# Patient Record
Sex: FEMALE | Race: WHITE | Employment: FULL TIME | URBAN - METROPOLITAN AREA
[De-identification: names, ages, dates, MRNs, and addresses within clinical notes are randomized per-mention and may not be internally consistent; named-entity substitution may affect disease eponyms.]

---

## 2018-10-01 ENCOUNTER — TRANSFERRED RECORDS (OUTPATIENT)
Dept: HEALTH INFORMATION MANAGEMENT | Facility: CLINIC | Age: 23
End: 2018-10-01

## 2018-10-01 LAB — PAP SMEAR - HIM PATIENT REPORTED: NEGATIVE

## 2019-03-19 ENCOUNTER — OFFICE VISIT (OUTPATIENT)
Dept: FAMILY MEDICINE | Facility: CLINIC | Age: 24
End: 2019-03-19
Payer: COMMERCIAL

## 2019-03-19 VITALS
TEMPERATURE: 98.3 F | RESPIRATION RATE: 16 BRPM | BODY MASS INDEX: 26.06 KG/M2 | OXYGEN SATURATION: 97 % | HEART RATE: 71 BPM | DIASTOLIC BLOOD PRESSURE: 61 MMHG | HEIGHT: 61 IN | WEIGHT: 138 LBS | SYSTOLIC BLOOD PRESSURE: 101 MMHG

## 2019-03-19 DIAGNOSIS — F41.1 GENERALIZED ANXIETY DISORDER: ICD-10-CM

## 2019-03-19 DIAGNOSIS — F33.41 RECURRENT MAJOR DEPRESSION IN PARTIAL REMISSION (H): Primary | ICD-10-CM

## 2019-03-19 DIAGNOSIS — M08.00 JRA (JUVENILE RHEUMATOID ARTHRITIS) (H): ICD-10-CM

## 2019-03-19 PROCEDURE — 99203 OFFICE O/P NEW LOW 30 MIN: CPT | Performed by: NURSE PRACTITIONER

## 2019-03-19 SDOH — HEALTH STABILITY: MENTAL HEALTH: HOW OFTEN DO YOU HAVE A DRINK CONTAINING ALCOHOL?: NEVER

## 2019-03-19 ASSESSMENT — MIFFLIN-ST. JEOR: SCORE: 1310.4

## 2019-03-19 NOTE — PROGRESS NOTES
"    SUBJECTIVE:   Loraine Davis is a 24 year old female who presents to clinic today for the following health issues:        Abnormal Mood Symptoms      Duration: 6 months     Description:  Depression: YES  Anxiety: YES  Panic attacks: no      Accompanying signs and symptoms: see PHQ-9 and NICHO scores    History (similar episodes/previous evaluation): yes    Precipitating or alleviating factors: None    Has been off all antidepressants for almost 6 months due to no insurance.   Was stable on lamictal.    Has been flaring since going off meds.   Wants to restart  [place]oved into the city and needs a new psychiatrist.         Musculoskeletal problem/pain  PMH JRA diagnosed and treated at [facility] in teens.    Needs a new rheumatologist.      Duration: 2 weeks     Description  Location: both hands and wrist     Intensity:  moderate    Accompanying signs and symptoms: numbness      Problem list and histories reviewed & adjusted, as indicated.  Additional history: as documented    Reviewed and updated as needed this visit by clinical staff  Tobacco  Allergies  Meds  Problems  Med Hx  Surg Hx  Fam Hx  Soc Hx        Reviewed and updated as needed this visit by Provider  Tobacco  Allergies  Meds  Problems  Med Hx  Surg Hx  Fam Hx           ROS:  Constitutional, HEENT, cardiovascular, pulmonary, GI, , musculoskeletal, neuro, skin, endocrine and psych systems are negative, except as otherwise noted.    OBJECTIVE:     /61   Pulse 71   Temp 98.3  F (36.8  C) (Oral)   Resp 16   Ht 1.537 m (5' 0.5\")   Wt 62.6 kg (138 lb)   SpO2 97%   BMI 26.51 kg/m    Body mass index is 26.51 kg/m .  GENERAL: healthy, alert and no distress  EYES: Eyes grossly normal to inspection, PERRL and conjunctivae and sclerae normal  HENT: ear canals and TM's normal, nose and mouth without ulcers or lesions  NECK: no adenopathy, no asymmetry, masses, or scars and thyroid normal to palpation  RESP: lungs clear to auscultation - " no rales, rhonchi or wheezes  CV: regular rate and rhythm, normal S1 S2, no S3 or S4, no murmur, click or rub, no peripheral edema and peripheral pulses strong  ABDOMEN: soft, nontender, no hepatosplenomegaly, no masses and bowel sounds normal  MS: no gross musculoskeletal defects noted, no edema  SKIN: no suspicious lesions or rashes  NEURO: Normal strength and tone, mentation intact and speech normal  PSYCH: mentation appears normal, affect normal/bright      ASSESSMENT/PLAN:       ICD-10-CM    1. Recurrent major depression in partial remission (H) F33.41 MENTAL HEALTH REFERRAL  - Adult; Psychiatry and Medication Management; Psychiatry; [facility]: Psychiatry Clinic [phone]; We will contact you to schedule the appointment or please call with any questions   2. Generalized anxiety disorder F41.1 MENTAL HEALTH REFERRAL  - Adult; Psychiatry and Medication Management; Psychiatry; [facility]: Psychiatry Clinic [phone]; We will contact you to schedule the appointment or please call with any questions   3. JRA (juvenile rheumatoid arthritis) (H) M08.00 RHEUMATOLOGY REFERRAL     Refer to establish with rheumatology and psychiatry.      See Patient Instructions    ASHL[facility]

## 2019-07-15 ENCOUNTER — OFFICE VISIT (OUTPATIENT)
Dept: URGENT CARE | Facility: URGENT CARE | Age: 24
End: 2019-07-15
Payer: COMMERCIAL

## 2019-07-15 VITALS
BODY MASS INDEX: 27.08 KG/M2 | RESPIRATION RATE: 16 BRPM | WEIGHT: 141 LBS | OXYGEN SATURATION: 97 % | DIASTOLIC BLOOD PRESSURE: 81 MMHG | TEMPERATURE: 99.4 F | HEART RATE: 85 BPM | SYSTOLIC BLOOD PRESSURE: 130 MMHG

## 2019-07-15 DIAGNOSIS — L03.115 CELLULITIS OF RIGHT FOOT: Primary | ICD-10-CM

## 2019-07-15 LAB
BASOPHILS # BLD AUTO: 0 10E9/L (ref 0–0.2)
BASOPHILS NFR BLD AUTO: 0.2 %
DIFFERENTIAL METHOD BLD: NORMAL
EOSINOPHIL # BLD AUTO: 0.1 10E9/L (ref 0–0.7)
EOSINOPHIL NFR BLD AUTO: 0.7 %
ERYTHROCYTE [DISTWIDTH] IN BLOOD BY AUTOMATED COUNT: 11.7 % (ref 10–15)
HCT VFR BLD AUTO: 43.1 % (ref 35–47)
HGB BLD-MCNC: 14.7 G/DL (ref 11.7–15.7)
LYMPHOCYTES # BLD AUTO: 1.3 10E9/L (ref 0.8–5.3)
LYMPHOCYTES NFR BLD AUTO: 16.2 %
MCH RBC QN AUTO: 31.1 PG (ref 26.5–33)
MCHC RBC AUTO-ENTMCNC: 34.1 G/DL (ref 31.5–36.5)
MCV RBC AUTO: 91 FL (ref 78–100)
MONOCYTES # BLD AUTO: 1 10E9/L (ref 0–1.3)
MONOCYTES NFR BLD AUTO: 11.5 %
NEUTROPHILS # BLD AUTO: 5.9 10E9/L (ref 1.6–8.3)
NEUTROPHILS NFR BLD AUTO: 71.4 %
PLATELET # BLD AUTO: 291 10E9/L (ref 150–450)
RBC # BLD AUTO: 4.73 10E12/L (ref 3.8–5.2)
WBC # BLD AUTO: 8.2 10E9/L (ref 4–11)

## 2019-07-15 PROCEDURE — 96372 THER/PROPH/DIAG INJ SC/IM: CPT | Performed by: INTERNAL MEDICINE

## 2019-07-15 PROCEDURE — 99214 OFFICE O/P EST MOD 30 MIN: CPT | Mod: 25 | Performed by: INTERNAL MEDICINE

## 2019-07-15 PROCEDURE — 87070 CULTURE OTHR SPECIMN AEROBIC: CPT | Performed by: INTERNAL MEDICINE

## 2019-07-15 PROCEDURE — 36415 COLL VENOUS BLD VENIPUNCTURE: CPT | Performed by: INTERNAL MEDICINE

## 2019-07-15 PROCEDURE — 85025 COMPLETE CBC W/AUTO DIFF WBC: CPT | Performed by: INTERNAL MEDICINE

## 2019-07-15 RX ORDER — CEPHALEXIN 500 MG/1
500 CAPSULE ORAL 3 TIMES DAILY
Qty: 21 CAPSULE | Refills: 0 | Status: SHIPPED | OUTPATIENT
Start: 2019-07-15 | End: 2019-11-15

## 2019-07-15 RX ORDER — CEFTRIAXONE SODIUM 1 G
1 VIAL (EA) INJECTION ONCE
Status: COMPLETED | OUTPATIENT
Start: 2019-07-15 | End: 2019-07-15

## 2019-07-15 RX ADMIN — Medication 1 G: at 20:11

## 2019-07-15 ASSESSMENT — ENCOUNTER SYMPTOMS: FEVER: 0

## 2019-07-16 NOTE — PROGRESS NOTES
Subjective     Loraine Davis is a 24 year old female who presents to clinic today for the following health issues:    HPI   ED/UC Followup:    Facility:  [facility]  Date of visit: 7/15/2019  Reason for visit: TOE INJURY  Current Status: less painful,        Swelling has remained same.   Redness has improved.   Pain has improved and she was able to sleep.   Last night she had clear drainage after removing dressings. No drainage since.   No fever or chills.       Reviewed and updated as needed this visit by Provider         Review of Systems   ROS COMP: Constitutional, HEENT, cardiovascular, pulmonary, gi and gu systems are negative, except as otherwise noted.      Objective    There were no vitals taken for this visit.  There is no height or weight on file to calculate BMI.  Physical Exam   GENERAL: healthy, alert and no distress  EYES: Eyes grossly normal to inspection  HENT:  nose and mouth without ulcers or lesions  MS: no gross musculoskeletal defects noted, no edema  SKIN: blister on the right great toe, + tenderness and edema over the right foot up to 2nd metatarsal, white maceration from 3-5th toes     Diagnostic Test Results:  Labs reviewed in Epic        Assessment & Plan     1. Cellulitis of right foot  - symptoms improving   - advised below  Continue ibuprofen as needed   Continue ice and elevating foot  Would cultures are still being processed, urgent care provider will let you know about the results  Follow up within next 2-3 days if symptoms are not improving     2. Tinea pedis of right foot  - advised below  Once your symptoms improve - you can use over the counter clotrimazole twice daily, apply to the affected toes until you notice improvement     Maddie Colindres MD  [facility]

## 2019-07-16 NOTE — PATIENT INSTRUCTIONS
Keflex 3 x day for 10 days. Yogurt.  antibiotics shot given here today  Watch for fever & spreading beyond markings.    Elevate foot.  Close follow up.  Recheck tomorrow    To ER if worse    White count normal & is reassuring    Component      Latest Ref Rng & Units 7/15/2019   WBC      4.0 - 11.0 10e9/L 8.2   RBC Count      3.8 - 5.2 10e12/L 4.73   Hemoglobin      11.7 - 15.7 g/dL 14.7   Hematocrit      35.0 - 47.0 % 43.1   MCV      78 - 100 fl 91   MCH      26.5 - 33.0 pg 31.1   MCHC      31.5 - 36.5 g/dL 34.1   RDW      10.0 - 15.0 % 11.7   Platelet Count      150 - 450 10e9/L 291   % Neutrophils      % 71.4   % Lymphocytes      % 16.2   % Monocytes      % 11.5   % Eosinophils      % 0.7   % Basophils      % 0.2   Absolute Neutrophil      1.6 - 8.3 10e9/L 5.9   Absolute Lymphocytes      0.8 - 5.3 10e9/L 1.3   Absolute Monocytes      0.0 - 1.3 10e9/L 1.0   Absolute Eosinophils      0.0 - 0.7 10e9/L 0.1   Absolute Basophils      0.0 - 0.2 10e9/L 0.0   Diff Method       Automated Method       Patient Education     Cellulitis  Cellulitis is an infection of the deep layers of skin. A break in the skin, such as a cut or scratch, can let bacteria under the skin. If the bacteria get to deep layers of the skin, it can be serious. If not treated, cellulitis can get into the bloodstream and lymph nodes. The infection can then spread throughout the body. This causes serious illness.  Cellulitis causes the affected skin to become red, swollen, warm, and sore. The reddened areas have a visible border. An open sore may leak fluid (pus). You may have a fever, chills, and pain.  Cellulitis is treated with antibiotics taken for 7 to 10 days. An open sore may be cleaned and covered with cool wet gauze. Symptoms should get better 1 to 2 days after treatment is started. Make sure to take all the antibiotics for the full number of days until they are gone. Keep taking the medicine even if your symptoms go away.  Home care  Follow  these tips:    Limit the use of the part of your body with cellulitis.     If the infection is on your leg, keep your leg raised while sitting. This will help to reduce swelling.    Take all of the antibiotic medicine exactly as directed until it is gone. Do not miss any doses, especially during the first 7 days. Don t stop taking the medicine when your symptoms get better.    Keep the affected area clean and dry.    Wash your hands with soap and warm water before and after touching your skin. Anyone else who touches your skin should also wash his or her hands. Don't share towels.  Follow-up care  Follow up with your healthcare provider, or as advised. If your infection does not go away on the first antibiotic, your healthcare provider will prescribe a different one.  When to seek medical advice  Call your healthcare provider right away if any of these occur:    Red areas that spread    Swelling or pain that gets worse    Fluid leaking from the skin (pus)    Fever higher of 100.4  F (38.0  C) or higher after 2 days on antibiotics  Date Last Reviewed: 9/1/2016 2000-2018 [employer]. [address] [place] [address], [place] [address]. All rights reserved. This information is not intended as a substitute for professional medical care. Always follow your healthcare professional's instructions.

## 2019-07-16 NOTE — PROGRESS NOTES
SUBJECTIVE:   Loraine Davis is a 24 year old female presenting with a chief complaint of   Chief Complaint   Patient presents with     Toe Injury     right little toe, had xrays friday. now has white sots and very swollen       She is an established patient of [facility] [place].        Onset of symptoms was 5 day(s) ago.  Woke up with right pinky toe pain  Seen in urgency room 4 days ago.  Xray's normal. Checked for puncture wounds.   Dx with sprain  Since then  Course of illness is worsening.    Location: right foot  Context: more swollen, red/purple and now with white blisters  Current and Associated symptoms: redness, warmth, swelling and pain  Treatment measures tried include: ibuprofen, elevation and foot boot  Relief from treatment: minor        Review of Systems   Constitutional: Negative for fever.       No past medical history on file.  No family history on file.  Current Outpatient Medications   Medication Sig Dispense Refill     cephALEXin (KEFLEX) 500 MG capsule Take 1 capsule (500 mg) by mouth 3 times daily for 10 days 21 capsule 0     albuterol (PROAIR HFA, PROVENTIL HFA, VENTOLIN HFA) 108 (90 BASE) MCG/ACT inhaler Take 2 puffs inhaled every 4 hrs prn wheezing 1 Inhaler 11     levonorgestrel (MIRENA) 20 MCG/24HR IUD 1 each (20 mcg) by Intrauterine route once for 1 dose Placed 10/2019 at [facility] in [place]       Social History     Tobacco Use     Smoking status: Never Smoker     Smokeless tobacco: Never Used   Substance Use Topics     Alcohol use: Yes     Frequency: Never       OBJECTIVE  /81 (BP Location: Right arm, Cuff Size: Adult Regular)   Pulse 85   Temp 99.4  F (37.4  C) (Oral)   Resp 16   Wt 64 kg (141 lb)   SpO2 97%   BMI 27.08 kg/m      Physical Exam   Constitutional: She appears well-developed and well-nourished.   Musculoskeletal:   right foot  Swollen, red painful foot with yellow pustules within   Surrounding purplish area which was 2 cm in length.    The above  purplish area is within  9 cm area of redness on dorsum of foot including 4th,5th toes    Beyond & above red area is 6 cm of pink skin (proximal dorsal foot/up to ankle.)    Area outlined & photo taken by pt       Vitals reviewed.      Labs:  Results for orders placed or performed in visit on 07/15/19 (from the past 24 hour(s))   CBC with platelets and differential   Result Value Ref Range    WBC 8.2 4.0 - 11.0 10e9/L    RBC Count 4.73 3.8 - 5.2 10e12/L    Hemoglobin 14.7 11.7 - 15.7 g/dL    Hematocrit 43.1 35.0 - 47.0 %    MCV 91 78 - 100 fl    MCH 31.1 26.5 - 33.0 pg    MCHC 34.1 31.5 - 36.5 g/dL    RDW 11.7 10.0 - 15.0 %    Platelet Count 291 150 - 450 10e9/L    % Neutrophils 71.4 %    % Lymphocytes 16.2 %    % Monocytes 11.5 %    % Eosinophils 0.7 %    % Basophils 0.2 %    Absolute Neutrophil 5.9 1.6 - 8.3 10e9/L    Absolute Lymphocytes 1.3 0.8 - 5.3 10e9/L    Absolute Monocytes 1.0 0.0 - 1.3 10e9/L    Absolute Eosinophils 0.1 0.0 - 0.7 10e9/L    Absolute Basophils 0.0 0.0 - 0.2 10e9/L    Diff Method Automated Method            ASSESSMENT:      ICD-10-CM    1. Cellulitis of right foot L03.115 cefTRIAXone (ROCEPHIN) injection 1 g     CBC with platelets and differential     Wound Culture Aerobic Bacterial     cephALEXin (KEFLEX) 500 MG capsule          Patient Instructions     Keflex 3 x day for 10 days. Yogurt.  antibiotics shot given here today  Watch for fever & spreading beyond markings.    Elevate foot.  Close follow up.  Recheck tomorrow    To ER if worse    White count normal & is reassuring    Component      Latest Ref Rng & Units 7/15/2019   WBC      4.0 - 11.0 10e9/L 8.2   RBC Count      3.8 - 5.2 10e12/L 4.73   Hemoglobin      11.7 - 15.7 g/dL 14.7   Hematocrit      35.0 - 47.0 % 43.1   MCV      78 - 100 fl 91   MCH      26.5 - 33.0 pg 31.1   MCHC      31.5 - 36.5 g/dL 34.1   RDW      10.0 - 15.0 % 11.7   Platelet Count      150 - 450 10e9/L 291   % Neutrophils      % 71.4   % Lymphocytes      % 16.2    % Monocytes      % 11.5   % Eosinophils      % 0.7   % Basophils      % 0.2   Absolute Neutrophil      1.6 - 8.3 10e9/L 5.9   Absolute Lymphocytes      0.8 - 5.3 10e9/L 1.3   Absolute Monocytes      0.0 - 1.3 10e9/L 1.0   Absolute Eosinophils      0.0 - 0.7 10e9/L 0.1   Absolute Basophils      0.0 - 0.2 10e9/L 0.0   Diff Method       Automated Method       Patient Education     Cellulitis  Cellulitis is an infection of the deep layers of skin. A break in the skin, such as a cut or scratch, can let bacteria under the skin. If the bacteria get to deep layers of the skin, it can be serious. If not treated, cellulitis can get into the bloodstream and lymph nodes. The infection can then spread throughout the body. This causes serious illness.  Cellulitis causes the affected skin to become red, swollen, warm, and sore. The reddened areas have a visible border. An open sore may leak fluid (pus). You may have a fever, chills, and pain.  Cellulitis is treated with antibiotics taken for 7 to 10 days. An open sore may be cleaned and covered with cool wet gauze. Symptoms should get better 1 to 2 days after treatment is started. Make sure to take all the antibiotics for the full number of days until they are gone. Keep taking the medicine even if your symptoms go away.  Home care  Follow these tips:    Limit the use of the part of your body with cellulitis.     If the infection is on your leg, keep your leg raised while sitting. This will help to reduce swelling.    Take all of the antibiotic medicine exactly as directed until it is gone. Do not miss any doses, especially during the first 7 days. Don t stop taking the medicine when your symptoms get better.    Keep the affected area clean and dry.    Wash your hands with soap and warm water before and after touching your skin. Anyone else who touches your skin should also wash his or her hands. Don't share towels.  Follow-up care  Follow up with your healthcare provider, or as  advised. If your infection does not go away on the first antibiotic, your healthcare provider will prescribe a different one.  When to seek medical advice  Call your healthcare provider right away if any of these occur:    Red areas that spread    Swelling or pain that gets worse    Fluid leaking from the skin (pus)    Fever higher of 100.4  F (38.0  C) or higher after 2 days on antibiotics  Date Last Reviewed: 9/1/2016 2000-2018 [employer]. [address] [place] [address], [place] [address]. All rights reserved. This information is not intended as a substitute for professional medical care. Always follow your healthcare professional's instructions.

## 2019-07-17 ENCOUNTER — OFFICE VISIT (OUTPATIENT)
Dept: FAMILY MEDICINE | Facility: CLINIC | Age: 24
End: 2019-07-17
Payer: COMMERCIAL

## 2019-07-17 VITALS
WEIGHT: 141 LBS | SYSTOLIC BLOOD PRESSURE: 108 MMHG | DIASTOLIC BLOOD PRESSURE: 68 MMHG | HEART RATE: 92 BPM | OXYGEN SATURATION: 97 % | TEMPERATURE: 98.5 F | BODY MASS INDEX: 27.08 KG/M2

## 2019-07-17 DIAGNOSIS — B35.3 TINEA PEDIS OF RIGHT FOOT: ICD-10-CM

## 2019-07-17 DIAGNOSIS — L03.115 CELLULITIS OF RIGHT FOOT: Primary | ICD-10-CM

## 2019-07-17 PROCEDURE — 99214 OFFICE O/P EST MOD 30 MIN: CPT | Performed by: FAMILY MEDICINE

## 2019-07-17 NOTE — PATIENT INSTRUCTIONS
Continue ibuprofen as needed   Continue ice and elevating foot  Would cultures are still being processed, urgent care provider will let you know about the results  Follow up within next 2-3 days if symptoms are not improving     Once your symptoms improve - you can use over the counter clotrimazole twice daily, apply to the affected toes until you notice improvement

## 2019-07-18 LAB
BACTERIA SPEC CULT: NORMAL
Lab: NORMAL
SPECIMEN SOURCE: NORMAL

## 2019-09-05 ENCOUNTER — TELEPHONE (OUTPATIENT)
Dept: FAMILY MEDICINE | Facility: CLINIC | Age: 24
End: 2019-09-05

## 2019-09-05 NOTE — LETTER
September 5, 2019      Loraine Talya Ryan  [address] [place] [address]        Preet Baron,      Our team is reaching out to you, in regards to your health. After a review of your chart it appears, that you have not completed the recommended chlamydia screening this year. The [place] Department of Health has recommended that you get yearly screening if you meet any of the following criteria:    If you currently are or have been sexually active  If you currently are or have been pregnant  If you currently are on or have been on contraception (even if NOT sexually active)    Annual testing is recommended for sexually active women between the ages of 15 and 25.     Chlamydia has no symptoms and left untreated, it can cause infertility and other serious health problems. Chlamydia is easily cured with antibiotics.      Chlamydia is the most common bacterial sexually transmitted disease in the [place], according to the Centers for Disease Control (CDC), yet many women considered at risk for the disease do not get the recommended annual screening test. Testing is done by leaving a urine sample with a lab-only appointment or you can make an office visit if you have other concerns.     In addition, we recommend that you make an Office Visit/Physical to follow up the following additional items due:    Health Maintenance Due   Topic Date Due     PREVENTIVE CARE VISIT  1996     CHLAMYDIA SCREENING  1996     DEPRESSION ACTION PLAN  1996     PHQ-9  1996     DTAP/TDAP/TD IMMUNIZATION (1 - Tdap) 02/08/2003     HIV SCREENING  02/08/2011     HPV IMMUNIZATION (1 - Female 3-dose series) 02/08/2011     INFLUENZA VACCINE (1) 09/01/2019         Also, please note that if you have not seen your provider in over a year a visit will be necessary for any refills to medications.    If you have already completed any of these items elsewhere please contact us with test name, a location, date, and  result through [employer] or call [phone] so we can update our records.     We also understand that you may have already discussed some this with your provider however, [facility] has an additional healthcare team specifically designed to help you remember to complete your regular screening tests.  We apologize in advance for any duplicate messages you may have received, we hope you understand that our primary goal is your health and well-being.        Thank you for trusting us with your health care,      Your [facility] Care Team

## 2019-09-05 NOTE — TELEPHONE ENCOUNTER
Panel Management Review      Patient has the following on her problem list: Chlamydia screening      Composite cancer screening  Chart review shows that this patient is due/due soon for the following None  Summary:    Patient is due/failing the following:   Ua/swab for std screening    Action needed:   Patient needs office visit for std screening.    Type of outreach:    Sent letter.    Questions for provider review:    None                                                                                                                                    Olayinka Cameron CMA     Chart routed to Care Team. First attempt

## 2019-10-01 ENCOUNTER — TRANSFERRED RECORDS (OUTPATIENT)
Dept: HEALTH INFORMATION MANAGEMENT | Facility: CLINIC | Age: 24
End: 2019-10-01

## 2019-11-02 ENCOUNTER — TRANSFERRED RECORDS (OUTPATIENT)
Dept: HEALTH INFORMATION MANAGEMENT | Facility: CLINIC | Age: 24
End: 2019-11-02

## 2019-11-02 LAB — CHLAMYDIA - HIM PATIENT REPORTED: NEGATIVE

## 2019-11-15 ENCOUNTER — OFFICE VISIT (OUTPATIENT)
Dept: FAMILY MEDICINE | Facility: CLINIC | Age: 24
End: 2019-11-15
Payer: COMMERCIAL

## 2019-11-15 VITALS
DIASTOLIC BLOOD PRESSURE: 80 MMHG | SYSTOLIC BLOOD PRESSURE: 110 MMHG | BODY MASS INDEX: 29.2 KG/M2 | WEIGHT: 152 LBS | OXYGEN SATURATION: 97 % | TEMPERATURE: 98.2 F | HEART RATE: 76 BPM

## 2019-11-15 DIAGNOSIS — F33.1 MODERATE EPISODE OF RECURRENT MAJOR DEPRESSIVE DISORDER (H): ICD-10-CM

## 2019-11-15 DIAGNOSIS — Z01.818 PREOP GENERAL PHYSICAL EXAM: Primary | ICD-10-CM

## 2019-11-15 DIAGNOSIS — Z23 NEED FOR PROPHYLACTIC VACCINATION AND INOCULATION AGAINST INFLUENZA: ICD-10-CM

## 2019-11-15 DIAGNOSIS — M06.9 RHEUMATOID ARTHRITIS, INVOLVING UNSPECIFIED SITE, UNSPECIFIED RHEUMATOID FACTOR PRESENCE: ICD-10-CM

## 2019-11-15 DIAGNOSIS — F41.1 GAD (GENERALIZED ANXIETY DISORDER): ICD-10-CM

## 2019-11-15 DIAGNOSIS — N83.201 RIGHT OVARIAN CYST: ICD-10-CM

## 2019-11-15 LAB
HCG UR QL: NEGATIVE
HGB BLD-MCNC: 14.5 G/DL (ref 11.7–15.7)

## 2019-11-15 PROCEDURE — 90686 IIV4 VACC NO PRSV 0.5 ML IM: CPT | Performed by: FAMILY MEDICINE

## 2019-11-15 PROCEDURE — 81025 URINE PREGNANCY TEST: CPT | Performed by: FAMILY MEDICINE

## 2019-11-15 PROCEDURE — 99215 OFFICE O/P EST HI 40 MIN: CPT | Mod: 25 | Performed by: FAMILY MEDICINE

## 2019-11-15 PROCEDURE — 36415 COLL VENOUS BLD VENIPUNCTURE: CPT | Performed by: FAMILY MEDICINE

## 2019-11-15 PROCEDURE — 85018 HEMOGLOBIN: CPT | Performed by: FAMILY MEDICINE

## 2019-11-15 PROCEDURE — 90471 IMMUNIZATION ADMIN: CPT | Performed by: FAMILY MEDICINE

## 2019-11-15 RX ORDER — LAMOTRIGINE 25 MG/1
25 TABLET ORAL DAILY
Qty: 60 TABLET | Refills: 1 | Status: SHIPPED | OUTPATIENT
Start: 2019-11-15 | End: 2020-02-26

## 2019-11-15 ASSESSMENT — ANXIETY QUESTIONNAIRES
5. BEING SO RESTLESS THAT IT IS HARD TO SIT STILL: NOT AT ALL
3. WORRYING TOO MUCH ABOUT DIFFERENT THINGS: NEARLY EVERY DAY
6. BECOMING EASILY ANNOYED OR IRRITABLE: NEARLY EVERY DAY
1. FEELING NERVOUS, ANXIOUS, OR ON EDGE: NEARLY EVERY DAY
4. TROUBLE RELAXING: NEARLY EVERY DAY
GAD7 TOTAL SCORE: 18
7. FEELING AFRAID AS IF SOMETHING AWFUL MIGHT HAPPEN: NEARLY EVERY DAY
2. NOT BEING ABLE TO STOP OR CONTROL WORRYING: NEARLY EVERY DAY
IF YOU CHECKED OFF ANY PROBLEMS ON THIS QUESTIONNAIRE, HOW DIFFICULT HAVE THESE PROBLEMS MADE IT FOR YOU TO DO YOUR WORK, TAKE CARE OF THINGS AT HOME, OR GET ALONG WITH OTHER PEOPLE: EXTREMELY DIFFICULT

## 2019-11-15 ASSESSMENT — MIFFLIN-ST. JEOR: SCORE: 1370.71

## 2019-11-15 ASSESSMENT — PATIENT HEALTH QUESTIONNAIRE - PHQ9: SUM OF ALL RESPONSES TO PHQ QUESTIONS 1-9: 12

## 2019-11-15 NOTE — RESULT ENCOUNTER NOTE
Please send the letter to the patient with the following.         Here are your results for your recent labs which are normal. Please call or message me if you have questions or concerns.

## 2019-11-15 NOTE — PROGRESS NOTES
[place] [id number] [place] [id number] [phone]  Dept: [phone]    PRE-OP EVALUATION:  Today's date: 11/15/2019    Loraine Davis (: 1995) presents for pre-operative evaluation assessment as requested by Dr. go.  She requires evaluation and anesthesia risk assessment prior to undergoing surgery/procedure for treatment of  .   LAPAROSCOPIC RIGHT OVARIAN CYSTECTOMY    Proposed Surgery/ Procedure:LAPAROSCOPIC RIGHT OVARIAN CYSTECTOMY   Date of Surgery/ Procedure: 2019  Time of Surgery/ Procedure: 9.00am  Hospital/Surgical Facility: [facility] [phone]    Primary Physician: No Ref-Primary, Physician  Type of Anesthesia Anticipated: unknown     Patient has a Health Care Directive or Living Will:  NO    1. NO - Do you have a history of heart attack, stroke, stent, bypass or surgery on an artery in the head, neck, heart or legs?  2. NO - Do you ever have any pain or discomfort in your chest?  3. NO - Do you have a history of  Heart Failure?  4. NO - Are you troubled by shortness of breath when: walking on the level, up a slight hill or at night?  5. NO - Do you currently have a cold, bronchitis or other respiratory infection?  6. NO - Do you have a cough, shortness of breath or wheezing?  7. NO - Do you sometimes get pains in the calves of your legs when you walk?  8. NO - Do you or anyone in your family have previous history of blood clots?  9. NO - Do you or does anyone in your family have a serious bleeding problem such as prolonged bleeding following surgeries or cuts?  10. Yes, 3 years ago, no problems since - Have you ever had problems with anemia or been told to take iron pills?  11. NO - Have you had any abnormal blood loss such as black, tarry or bloody stools, or abnormal vaginal bleeding?  12. NO - Have you ever had a blood transfusion?  13. NO - Have you or any of your relatives ever had problems with  anesthesia?  14. NO - Do you have sleep apnea, excessive snoring or daytime drowsiness?  15. NO - Do you have any prosthetic heart valves?  16. NO - Do you have prosthetic joints?  17. NO - Is there any chance that you may be pregnant?      HPI:     HPI related to upcoming procedure: right ovarian cyst - Over the last month she has had RLQ. Pain is intermittent. No nausea, vomiting, fever, chills, dysuria or vaginal discharge.     RA - She is not followed by rheumatologist and not on immunosuppressants. No current symptoms.     Depression/anxiety - She does see a therapist every one to two weeks. She is not on medication and trying to get back on medication. She used to take Lamictal and that really worked for her. There was concern for manic/depression episodes. No manic episodes. Buspar wasn't helpful.      See problem list for active medical problems.  Problems all longstanding and stable, except as noted/documented.  See ROS for pertinent symptoms related to these conditions.      MEDICAL HISTORY:     Patient Active Problem List    Diagnosis Date Noted     RA (rheumatoid arthritis) (H) 02/24/2015     Priority: Medium      Surgeries - wisdom teeth   Current Outpatient Medications   Medication Sig Dispense Refill     Valtrex         levonorgestrel (MIRENA) 20 MCG/24HR IUD 1 each (20 mcg) by Intrauterine route once for 1 dose Placed 10/2019 at [facility]       OTC products: None, except as noted above    No Known Allergies   Latex Allergy: NO    Social History     Tobacco Use     Smoking status: Never Smoker     Smokeless tobacco: Never Used   Substance Use Topics     Alcohol use: Yes     Frequency: Never     History   Drug Use No       REVIEW OF SYSTEMS:   CONSTITUTIONAL: NEGATIVE for fever, chills, change in weight  INTEGUMENTARY/SKIN: NEGATIVE for worrisome rashes, moles or lesions  EYES: NEGATIVE for vision changes or irritation  ENT/MOUTH: NEGATIVE for ear, mouth and throat problems  RESP:  NEGATIVE for significant cough or SOB  BREAST: NEGATIVE for masses, tenderness or discharge  CV: NEGATIVE for chest pain, palpitations or peripheral edema  GI: NEGATIVE for nausea, abdominal pain, heartburn, or change in bowel habits  : NEGATIVE for frequency, dysuria, or hematuria  MUSCULOSKELETAL: NEGATIVE for significant arthralgias or myalgia  NEURO: NEGATIVE for weakness, dizziness or paresthesias  ENDOCRINE: NEGATIVE for temperature intolerance, skin/hair changes  HEME: NEGATIVE for bleeding problems  PSYCHIATRIC: NEGATIVE for changes in mood or affect    EXAM:   /80 (BP Location: Right arm, Patient Position: Sitting, Cuff Size: Adult Small)   Pulse 76   Temp 98.2  F (36.8  C) (Oral)   Wt 68.9 kg (152 lb)   SpO2 97%   BMI 29.20 kg/m      GENERAL APPEARANCE: healthy, alert and no distress     EYES: EOMI, PERRL     HENT: ear canals and TM's normal and nose and mouth without ulcers or lesions     NECK: no adenopathy, no asymmetry, masses, or scars and thyroid normal to palpation     RESP: lungs clear to auscultation - no rales, rhonchi or wheezes     CV: regular rates and rhythm, normal S1 S2     ABDOMEN:  soft, nontender, no HSM or masses and bowel sounds normal     MS: extremities normal- no gross deformities noted, no evidence of inflammation in joints, FROM in all extremities.     SKIN: no suspicious lesions or rashes     NEURO: Normal strength and tone, sensory exam grossly normal, mentation intact and speech normal     PSYCH: mentation appears normal. and affect normal     LYMPHATICS: No cervical adenopathy    DIAGNOSTICS:     EKG: Not indicated due to non-vascular surgery and low risk of event (age <65 and without cardiac risk factors)  Labs Resulted Today:   Results for orders placed or performed in visit on 11/15/19   Hemoglobin     Status: None   Result Value Ref Range    Hemoglobin 14.5 11.7 - 15.7 g/dL   HCG Qual, Urine ([id number])     Status: None   Result Value Ref Range    HCG Qual Urine  Negative NEG^Negative       IMPRESSION:   Reason for surgery/procedure: right ovarian cyst   Diagnosis/reason for consult: pre-op     The proposed surgical procedure is considered INTERMEDIATE risk.    REVISED CARDIAC RISK INDEX  The patient has the following serious cardiovascular risks for perioperative complications such as (MI, PE, VFib and 3  AV Block):  No serious cardiac risks  INTERPRETATION: 0 risks: Class I (very low risk - 0.4% complication rate)    The patient has the following additional risks for perioperative complications:  No identified additional risks      ICD-10-CM    1. Preop general physical exam Z01.818      2. Right ovarian cyst      3. Moderate episode of recurrent major depressive disorder (H)    4. NICHO (generalized anxiety disorder)    5. RA    RECOMMENDATIONS:     --Consult hospital rounder / IM to assist post-op medical management    --Patient is to take all scheduled medications on the day of surgery EXCEPT for modifications listed below.    1. Preop general physical exam  - Hemoglobin  - HCG Qual, Urine ([id number])    2. Right ovarian cyst  - Hemoglobin  - HCG Qual, Urine ([id number])    3. Moderate episode of recurrent major depressive disorder (H)  - Pt is currently seeing a therapist. She was previously seeing a psychiatrist and concern for bipolar. She was taking Lamictal which helped with symptoms. She would like to reestablish care with psychiatrist. I have placed a referral for psychiatrist and restarted pt on Lamictal 25 mg daily for 2 weeks, if tolerating medication then increase to 50 mg daily. Advised to call if having side effects. Discussed going to ER if experiencing any active SI.     - lamoTRIgine (LAMICTAL) 25 MG tablet; Take 1 tablet (25 mg) by mouth daily  Dispense: 60 tablet; Refill: 1  - MENTAL HEALTH REFERRAL  - Adult; Psychiatry and Medication Management; Psychiatry; [facility]: [facility] [phone].  Medication management & future refills  will be returned to [facility] PCP upon completion of evaluation; We conrado...    4. NICHO (generalized anxiety disorder)  - lamoTRIgine (LAMICTAL) 25 MG tablet; Take 1 tablet (25 mg) by mouth daily  Dispense: 60 tablet; Refill: 1  - MENTAL HEALTH REFERRAL  - Adult; Psychiatry and Medication Management; Psychiatry; [facility]: [facility] [phone].  Medication management & future refills will be returned to [facility] PCP upon completion of evaluation; We conrado...    5. RA -   Not on immunosuppressants.     No Advil or Aleve 3 days before surgery.  No Aspirin 7 days before surgery.      APPROVAL GIVEN to proceed with proposed procedure, without further diagnostic evaluation       Signed Electronically by: Maddie Colindres MD    Copy of this evaluation report is provided to requesting physician.    [facility] Preop Guidelines    Revised Cardiac Risk Index

## 2019-11-15 NOTE — LETTER
November 15, 2019      Loraine Babin Ryan  [address]  [place] [address]        Dear ,    We are writing to inform you of your test results.    Here are your results for your recent labs which are normal. Please call or message me if you have questions or concerns    Resulted Orders   Hemoglobin   Result Value Ref Range    Hemoglobin 14.5 11.7 - 15.7 g/dL   HCG Qual, Urine ([id number])   Result Value Ref Range    HCG Qual Urine Negative NEG^Negative      Comment:      This test is for screening purposes.  Results should be interpreted along with   the clinical picture.  Confirmation testing is available if warranted by   ordering CAL209, HCG Quantitative Pregnancy.         If you have any questions or concerns, please call the clinic at the number listed above.       Sincerely,        Maddie Colindres MD/nr

## 2019-11-15 NOTE — PATIENT INSTRUCTIONS
Lamictal 25 mg daily for 2 weeks. If no side effects then increase to 50 mg daily.      No Advil or Aleve 3 days before surgery.  No Aspirin 7 days before surgery.        Before Your Surgery      Call your surgeon if there is any change in your health. This includes signs of a cold or flu (such as a sore throat, runny nose, cough, rash or fever).    Do not smoke, drink alcohol or take over the counter medicine (unless your surgeon or primary care doctor tells you to) for the 24 hours before and after surgery.    If you take prescribed drugs: Follow your doctor s orders about which medicines to take and which to stop until after surgery.    Eating and drinking prior to surgery: follow the instructions from your surgeon    Take a shower or bath the night before surgery. Use the soap your surgeon gave you to gently clean your skin. If you do not have soap from your surgeon, use your regular soap. Do not shave or scrub the surgery site.  Wear clean pajamas and have clean sheets on your bed.

## 2019-11-16 ASSESSMENT — ANXIETY QUESTIONNAIRES: GAD7 TOTAL SCORE: 18

## 2019-11-18 ENCOUNTER — ANESTHESIA - HEALTHEAST (OUTPATIENT)
Dept: SURGERY | Facility: AMBULATORY SURGERY CENTER | Age: 24
End: 2019-11-18

## 2019-11-19 ENCOUNTER — SURGERY - HEALTHEAST (OUTPATIENT)
Dept: SURGERY | Facility: AMBULATORY SURGERY CENTER | Age: 24
End: 2019-11-19

## 2019-11-19 ASSESSMENT — MIFFLIN-ST. JEOR: SCORE: 1370.71

## 2020-01-27 ENCOUNTER — OFFICE VISIT (OUTPATIENT)
Dept: PSYCHOLOGY | Facility: CLINIC | Age: 25
End: 2020-01-27
Attending: FAMILY MEDICINE
Payer: COMMERCIAL

## 2020-01-27 VITALS
HEIGHT: 61 IN | SYSTOLIC BLOOD PRESSURE: 124 MMHG | WEIGHT: 150.8 LBS | BODY MASS INDEX: 28.47 KG/M2 | HEART RATE: 80 BPM | OXYGEN SATURATION: 98 % | DIASTOLIC BLOOD PRESSURE: 75 MMHG | TEMPERATURE: 97.8 F

## 2020-01-27 DIAGNOSIS — F31.81 BIPOLAR II DISORDER (H): Primary | ICD-10-CM

## 2020-01-27 DIAGNOSIS — F41.1 GENERALIZED ANXIETY DISORDER: ICD-10-CM

## 2020-01-27 PROCEDURE — 84443 ASSAY THYROID STIM HORMONE: CPT | Performed by: PSYCHIATRY & NEUROLOGY

## 2020-01-27 PROCEDURE — 36415 COLL VENOUS BLD VENIPUNCTURE: CPT | Performed by: PSYCHIATRY & NEUROLOGY

## 2020-01-27 PROCEDURE — 90792 PSYCH DIAG EVAL W/MED SRVCS: CPT | Performed by: PSYCHIATRY & NEUROLOGY

## 2020-01-27 RX ORDER — VALACYCLOVIR HYDROCHLORIDE 500 MG/1
500 TABLET, FILM COATED ORAL DAILY
COMMUNITY

## 2020-01-27 RX ORDER — LAMOTRIGINE 100 MG/1
TABLET ORAL
Qty: 45 TABLET | Refills: 1 | Status: SHIPPED | OUTPATIENT
Start: 2020-01-27 | End: 2020-05-27

## 2020-01-27 RX ORDER — ZOLPIDEM TARTRATE 5 MG/1
5 TABLET ORAL
Qty: 30 TABLET | Refills: 0 | Status: SHIPPED | OUTPATIENT
Start: 2020-01-27 | End: 2020-05-27

## 2020-01-27 RX ORDER — LAMOTRIGINE 25 MG/1
TABLET ORAL
Qty: 35 TABLET | Refills: 0 | Status: SHIPPED | OUTPATIENT
Start: 2020-01-27 | End: 2020-02-26

## 2020-01-27 SDOH — HEALTH STABILITY: MENTAL HEALTH: HOW MANY STANDARD DRINKS CONTAINING ALCOHOL DO YOU HAVE ON A TYPICAL DAY?: 1 OR 2

## 2020-01-27 SDOH — HEALTH STABILITY: MENTAL HEALTH: HOW OFTEN DO YOU HAVE A DRINK CONTAINING ALCOHOL?: 2-3 TIMES A WEEK

## 2020-01-27 SDOH — HEALTH STABILITY: MENTAL HEALTH: HOW OFTEN DO YOU HAVE 6 OR MORE DRINKS ON ONE OCCASION?: NEVER

## 2020-01-27 ASSESSMENT — MIFFLIN-ST. JEOR: SCORE: 1377.4

## 2020-01-27 NOTE — PATIENT INSTRUCTIONS
Increase lamotrigine to 50 mg for one week, then 75 mg for one week, then 100 mg for one week, then 150 mg daily.    Start Ambien 2.5 to 5 mg at bedtime as needed.    Continue all other medications per your primary care provider.    Keep seeing your therapist.    Schedule an appointment with me in 4 weeks or sooner as needed.  Call [facility] at [phone] to schedule or schedule at the .     [facility] Resources:      Go to the Emergency Department as needed or call after hours crisis line at [phone].      To schedule individual or family therapy, call [facility] at [phone].     Follow up with primary care provider as planned or sooner for acute medical concerns.    Call the psychiatric nurse line with medication questions or concerns at [phone].    [employer] may be used to communicate with your provider, but this is not intended to be used for emergencies.    Community Resources:      National Suicide Prevention Lifeline: 592.605.7409 (TTY: 777.507.7158). Call anytime for help.  (www.suicidepreventionlifeline.org)    National [place] on Mental Illness (www.freddie.org): 456.644.8908 or 018-645-4852.     Mental Health Association (www.mentalhealth.org): 392.285.8016 or 550-097-5861.    [place] Crisis Text Line: Text [place] to 243359    Suicide LifeLine Chat: suicidepreventionlifeline.org/chat

## 2020-01-27 NOTE — PROGRESS NOTES
"                                                         Outpatient Psychiatric Evaluation- Standard  Adult    Name:  Loraine Davis  : 1995    Source of Referral:  Primary Care Provider: Maddie Colindres MD  Current Psychotherapist: Jamee Lopez at [facility] . in [place]    Identifying Data:  Patient is a 24 year old, single  White  female  who presents for initial visit.  Patient attended the session alone. Patient prefers to be called Loraine .    Consent for treatment signed and included in electronic medical record.  My Practice Policy was reviewed and signed.     Chief Complaint:  \"I was previously diagnosed with recurrent major depression and generalized anxiety disorder.\"    HPI:  Loraine reports that she first sought treatment for psychiatric illness in  when she was a purnima in high school.  She thinks that she was referred to a psychiatrist by her school counselor and was started on citalopram (?).  She reports that she was depressed and anxious at the time.  She owned a horse, and he passed away that year after being quite ill.  She says that she was not able to function, was failing classes, and sleeping excessively.    She started seeing her current therapist, Jamee Lopez, when she was 19 years old.  She also saw someone who prescribed medications at [facility].  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  She says that she had a lot of energy and could function without very much sleep.  She also had increased sexual activity.  She reports that lamotrigine \"evened her out.\"  She was on and off lamotrigine from the time she was about 21 until she was about 23.  At that point she started feeling pretty good so she stopped taking meds about 2 years ago.      She is now feeling quite depressed. "  She denies severe depressive episodes in the last 2 years, and has not had any significant manic episodes.  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  She and her boyfriend of 2 years broke up about 2 weeks ago.  He had some substance use problems and had been clean for couple of years and just started using again.  Unfortunately, he works at the same place she does, but her boss has been supportive about minimizing her contact with him.  Her father is a good support for her, but he travels for business and has been away lately so has not been available.    She reports moderate problems taking care of household responsibilities, concentrating, and in her day-to-day work, especially in the last 2 weeks.  Her emotions have been moderately affected by her problems.  She reports mild problems standing for long periods, joining in community activities, and maintaining a friendship.    Psychiatric Review of Symptoms:  Depression: Loraine reports feeling depressed with decreased interest and pleasure in her normal activities.  She feels somewhat hopeless and also helpless and worthless.  She denies feeling guilty.  She has been having difficulty falling asleep and staying asleep.  She wakes up about every 30 minutes, and has little energy.  Her appetite has been poor.  She has difficulty concentrating.  She has been slower at task completion.     Mood rating (1 to 10 with 10 being the best): 3   PHQ-9 scores:   PHQ-9 SCORE 11/15/2019   PHQ-9 Total Score 12       Brandie: She reports a history of one manic episode that lasted for approximately a month when she was 19 years old.  She endorses having increased self-confidence, needing less sleep, being more talkative, having racing thoughts, having more energy than usual, being more active than usual, being more interested in sex than usual, and engaging in risky behaviors.   MDQ Score:  "8, yes, minor problem    Anxiety: She reports that she has chronic anxiety and was anxious even as a child.  She describes herself as a perfectionist.  Her anxiety interferes with her ability to sleep and her daily functioning.  She has obsessive thoughts about \"worst-case scenarios.  She reports significant worry and difficulty controlling her worry.  She has difficulty relaxing.  She has been more irritable lately.   NICHO-7 scores:    NICHO-7 SCORE 11/15/2019   Total Score 18       Panic: Not explored    PTSD: She denies a history of life-threatening trauma.    OCD: She has some obsessive thoughts about \"worst-case scenarios.\"    Psychosis: She denies auditory or visual hallucinations, ideas of reference, thought insertion or extraction, or paranoid ideation.    Impulse control: She denies a history of shoplifting, gambling, or violent outbursts.    Eating Disorder: She denies a history of binging, purging, or restricting calories.    Psychiatric History:   Psychiatric hospitalizations include: Brief hospitalization at [facility] in 2015 or 2016.  She was severely depressed and was having some thoughts about hurting herself.  She did not attempt suicide.    No history of chemical dependency treatment    Suicide Risk Assessment:  Suicide Attempts: No   Self-injurious Behavior: Denies    Past Medical History:  Medical history:   Past Medical History:   Diagnosis Date     Juvenile rheumatoid arthritis (H)        Surgical history:   Past Surgical History:   Procedure Laterality Date     OVARY SURGERY         Pregnancy status: Not pregnant    Trauma: No    Review of Systems:  10 systems (general, cardiovascular, respiratory, eyes, ENT, endocrine, GI, , M/S, neurological) were reviewed. Most pertinent findings include occasional stiffness due to her arthritis. The remaining systems are all unremarkable.    Current Medications:    Current Outpatient Medications:      lamoTRIgine (LAMICTAL) 100 MG tablet, Take 100 mg " "daily for one week, then 150 mg PO daily., Disp: 45 tablet, Rfl: 1     lamoTRIgine (LAMICTAL) 25 MG tablet, Take 50 mg daily for one week, then 75 mg daily for one week, then take 100 mg tablet., Disp: 35 tablet, Rfl: 0     lamoTRIgine (LAMICTAL) 25 MG tablet, Take 1 tablet (25 mg) by mouth daily, Disp: 60 tablet, Rfl: 1     levonorgestrel (MIRENA) 20 MCG/24HR IUD, 1 each (20 mcg) by Intrauterine route once for 1 dose Placed 10/2019 at [facility] [place], Disp: , Rfl:      valACYclovir (VALTREX) 500 MG tablet, Take 500 mg by mouth daily 1x daily, Disp: , Rfl:      zolpidem (AMBIEN) 5 MG tablet, Take 1 tablet (5 mg) by mouth nightly as needed for sleep, Disp: 30 tablet, Rfl: 0    Supplements: Reviewed per Electronic Medical Record Today    The [place] Prescription Monitoring Program has been reviewed and there are no concerns about diversionary activity for controlled substances at this time.      Past medication trials include but are not limited to:   New Antidepressants:  Celexa (citalopram), Prozac (fluoxetine) and Wellbutrin, Zyban, Aplenzin (bupropion)  Mood Stabilizers:  Lamictal (lamotrigine)  Newer Antipsychotics: Seroquel (quetiapine)  Sedatives/Hypnotics:  Benadryl (diphenhydramine)     Citalopram was helpful in the past.  She tried Prozac briefly, it was not helpful.  She tried Wellbutrin, which was not helpful.  She was on lamotrigine 150 to 175 mg daily, and says it leveled her mood.  She took Seroquel at a low dose for sleep, but was overly sedated.  She has used Benadryl only for allergies.    Allergies:  Patient has no known allergies.    Vital Signs:  Vitals: /75 (BP Location: Right arm, Patient Position: Chair, Cuff Size: Adult Regular)   Pulse 80   Temp 97.8  F (36.6  C) (Oral)   Ht 1.551 m (5' 1.06\")   Wt 68.4 kg (150 lb 12.8 oz)   SpO2 98%   Breastfeeding No   BMI 28.43 kg/m      Labs:  Most recent laboratory results reviewed and the pertinent results include: "     11/15/2019: Hemoglobin 14.5    Most recent EKG none    Substance Use History:  Social History     Tobacco Use     Smoking status: Never Smoker     Smokeless tobacco: Never Used   Substance Use Topics     Alcohol use: Yes     Frequency: 2-3 times a week     Drinks per session: 1 or 2     Binge frequency: Never     Comment: moderate     Drug use: No      Family History:   Childhood: She denies any abuse or neglect in her childhood.  Current Living situation: She currently lives with her mother, feels safe at home.  She plans to move into an apartment at her aunt's house soon.  Patient reported family history includes:   Family History   Problem Relation Age of Onset     Asthma Father      Arthritis Father      Diabetes Father      Depression Maternal Grandmother      Anxiety Disorder Maternal Grandmother      Depression Paternal Grandmother      Other - See Comments Sister      Bipolar Disorder Paternal Uncle        Developmental History:  Noncontributory    Social History:   Interpersonal: She reports having some difficulty maintaining friendships.  Highest education level was she has approximately 3-1/2 years of college studying English.  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  None    Mental Status Examination:     Loraine is a 24-year-old woman who appears her stated age and in no acute distress.  She is neatly groomed in casual clothing.  Speech is clear and normal in rate and tone.  Eye contact is good.  Motor behavior is appropriate.  Affect is blunted.  Mood is dysphoric and anxious.  Thoughts are logical and spontaneous with no loose associations or flight of ideas.  Thought content shows no psychosis.  She denies suicidal or homicidal thoughts.    Sensorium is clear.  She is oriented to person, place, and time.  Memory appears to be intact for immediate, recent, and remote events.  Intelligence is estimated to be high average.   Abstractive ability appears to be intact.  Personal insight is good.  Personal judgment is fair.  Impersonal judgment appears to be intact.    Assessment and Plan:    ICD-10-CM    1. Bipolar II disorder (H) F31.81 lamoTRIgine (LAMICTAL) 25 MG tablet     lamoTRIgine (LAMICTAL) 100 MG tablet     zolpidem (AMBIEN) 5 MG tablet     TSH with free T4 reflex   2. Generalized anxiety disorder F41.1        Medical Comorbidities Include: See above    Patient Strengths:   Loraine  identified the following strengths: family support, insight, intelligence and work ethic.     Treatment Plan:  Patient Instructions   Increase lamotrigine to 50 mg for one week, then 75 mg for one week, then 100 mg for one week, then 150 mg daily.    Start Ambien 2.5 to 5 mg at bedtime as needed.    Continue all other medications per your primary care provider.    Keep seeing your therapist.    Schedule an appointment with me in 4 weeks or sooner as needed.  Call [facility] at [phone] to schedule or schedule at the .     [facility] Resources:      Go to the Emergency Department as needed or call after hours crisis line at [phone].      To schedule individual or family therapy, call [facility] at [phone].     Follow up with primary care provider as planned or sooner for acute medical concerns.    Call the psychiatric nurse line with medication questions or concerns at [phone].    [employer] may be used to communicate with your provider, but this is not intended to be used for emergencies.    Community Resources:      National Suicide Prevention Lifeline: 710.994.3786 (TTY: 206.816.9624). Call anytime for help.  (www.suicidepreventionlifeline.org)    National [place] on Mental Illness (www.freddie.org): 317.119.1173 or 537-769-9235.     Mental Health Association (www.mentalhealth.org): 608.492.5332 or 552-611-7116.    [place] Crisis Text Line: Text [place] to 672583    Suicide LifeLine Chat:  suicidepreventionlifeline.org/chat     Patient Status:  Patient will continue to be seen for ongoing consultation and stabilization.

## 2020-01-28 LAB — TSH SERPL DL<=0.005 MIU/L-ACNC: 1.23 MU/L (ref 0.4–4)

## 2020-01-28 ASSESSMENT — ANXIETY QUESTIONNAIRES
7. FEELING AFRAID AS IF SOMETHING AWFUL MIGHT HAPPEN: NEARLY EVERY DAY
3. WORRYING TOO MUCH ABOUT DIFFERENT THINGS: NEARLY EVERY DAY
1. FEELING NERVOUS, ANXIOUS, OR ON EDGE: NEARLY EVERY DAY
6. BECOMING EASILY ANNOYED OR IRRITABLE: NEARLY EVERY DAY
5. BEING SO RESTLESS THAT IT IS HARD TO SIT STILL: MORE THAN HALF THE DAYS
IF YOU CHECKED OFF ANY PROBLEMS ON THIS QUESTIONNAIRE, HOW DIFFICULT HAVE THESE PROBLEMS MADE IT FOR YOU TO DO YOUR WORK, TAKE CARE OF THINGS AT HOME, OR GET ALONG WITH OTHER PEOPLE: EXTREMELY DIFFICULT
GAD7 TOTAL SCORE: 20
2. NOT BEING ABLE TO STOP OR CONTROL WORRYING: NEARLY EVERY DAY

## 2020-01-28 ASSESSMENT — PATIENT HEALTH QUESTIONNAIRE - PHQ9
5. POOR APPETITE OR OVEREATING: NEARLY EVERY DAY
SUM OF ALL RESPONSES TO PHQ QUESTIONS 1-9: 19

## 2020-01-29 ASSESSMENT — ANXIETY QUESTIONNAIRES: GAD7 TOTAL SCORE: 20

## 2020-02-26 ENCOUNTER — OFFICE VISIT (OUTPATIENT)
Dept: PSYCHOLOGY | Facility: CLINIC | Age: 25
End: 2020-02-26
Payer: COMMERCIAL

## 2020-02-26 VITALS
SYSTOLIC BLOOD PRESSURE: 122 MMHG | HEART RATE: 85 BPM | DIASTOLIC BLOOD PRESSURE: 74 MMHG | WEIGHT: 153 LBS | BODY MASS INDEX: 28.85 KG/M2 | TEMPERATURE: 98.3 F

## 2020-02-26 DIAGNOSIS — F31.81 BIPOLAR II DISORDER (H): Primary | ICD-10-CM

## 2020-02-26 PROCEDURE — 99213 OFFICE O/P EST LOW 20 MIN: CPT | Performed by: PSYCHIATRY & NEUROLOGY

## 2020-02-26 PROCEDURE — 90833 PSYTX W PT W E/M 30 MIN: CPT | Performed by: PSYCHIATRY & NEUROLOGY

## 2020-02-26 RX ORDER — LAMOTRIGINE 100 MG/1
200 TABLET ORAL DAILY
Qty: 60 TABLET | Refills: 2 | Status: SHIPPED | OUTPATIENT
Start: 2020-02-26 | End: 2020-05-27

## 2020-02-26 ASSESSMENT — ANXIETY QUESTIONNAIRES
1. FEELING NERVOUS, ANXIOUS, OR ON EDGE: SEVERAL DAYS
3. WORRYING TOO MUCH ABOUT DIFFERENT THINGS: NEARLY EVERY DAY
GAD7 TOTAL SCORE: 11
6. BECOMING EASILY ANNOYED OR IRRITABLE: SEVERAL DAYS
7. FEELING AFRAID AS IF SOMETHING AWFUL MIGHT HAPPEN: SEVERAL DAYS
IF YOU CHECKED OFF ANY PROBLEMS ON THIS QUESTIONNAIRE, HOW DIFFICULT HAVE THESE PROBLEMS MADE IT FOR YOU TO DO YOUR WORK, TAKE CARE OF THINGS AT HOME, OR GET ALONG WITH OTHER PEOPLE: VERY DIFFICULT
5. BEING SO RESTLESS THAT IT IS HARD TO SIT STILL: SEVERAL DAYS
2. NOT BEING ABLE TO STOP OR CONTROL WORRYING: MORE THAN HALF THE DAYS

## 2020-02-26 ASSESSMENT — PATIENT HEALTH QUESTIONNAIRE - PHQ9
5. POOR APPETITE OR OVEREATING: MORE THAN HALF THE DAYS
SUM OF ALL RESPONSES TO PHQ QUESTIONS 1-9: 12

## 2020-02-26 NOTE — PROGRESS NOTES
Outpatient Psychiatric Progress Note    Name: Loraine Davis   : 1995                    Primary Care Provider: Maddie Colindres MD   Therapist: Jamee Lopez    PHQ-9 scores:  PHQ-9 SCORE 11/15/2019 2020 2020   PHQ-9 Total Score 12 19 12       NICHO-7 scores:  NICHO-7 SCORE 11/15/2019 2020 2020   Total Score 18 20 11       Patient Identification:  Loraine is a 25 year old year old, single  White American female  who presents for return visit with me.  Patient attended the session alone.     Interim History: Loraine reports that  She is feeling a little better with the increase in lamotrigine.  She feels more productive.  Her sleep has improved, she rarely uses Ambien.  She is in the process of getting ready to move, so is cleaning and organizing her possessions.  She is currently just starting a week of 150 mg of lamotrigine.  She denies any adverse side effects, and agreed to increase it to a target dose of 200 mg daily.    She rates her mood today as 6 out of 10 with 10 being the best.  She continues to have some trouble falling asleep, but says that she more often feels rested and alert when she wakes up.  She really is not enjoying many activities.  Her appetite is been poor.  She is having difficulty concentrating, for instance, she has always enjoyed reading, but cannot read right now.  She admits to being very distracted by thoughts of her recent relationship break-up.  She getting very mixed messages from her previous partner.    She also reports that she still has a lot of worry that she is not able to control.  She has difficulty relaxing and feels restless.  She is somewhat irritable, and feels as though something awful might happen.    Her father is currently stuck in [place] and will not be able to return for probably a month due to the coronavirus.  He apparently has a home and a family there, and she reports that he is not in any danger at this  point.    Vital Signs:   /74 (BP Location: Right arm, Patient Position: Chair, Cuff Size: Adult Regular)   Pulse 85   Temp 98.3  F (36.8  C) (Oral)   Wt 69.4 kg (153 lb)   Breastfeeding No   BMI 28.85 kg/m      Labs:  TSH   Date Value Ref Range Status   01/27/2020 1.23 0.40 - 4.00 mU/L Final     Current Medications:  Current Outpatient Medications   Medication     lamoTRIgine (LAMICTAL) 100 MG tablet     lamoTRIgine (LAMICTAL) 100 MG tablet     levonorgestrel (MIRENA) 20 MCG/24HR IUD     valACYclovir (VALTREX) 500 MG tablet     zolpidem (AMBIEN) 5 MG tablet     No current facility-administered medications for this visit.       Mental Status Examination:  Loraine is a 25-year-old woman who appears her stated age and in no acute distress.  She is neatly groomed in casual clothing.  Speech is clear and normal in rate and tone.  Eye contact is good.  Motor behavior is appropriate.  Affect is full.  Mood is mildly dysphoric and anxious.  Thoughts are logical and spontaneous with no loose associations or flight of ideas.  Thought content shows no psychosis.  No suicidal or homicidal thoughts.  She is alert and oriented x3.    Assessment and Plan:  Loraine is a 25-year-old woman with a diagnosis of bipolar disorder.  She is in the process of increasing lamotrigine to a target dose of 200 mg daily.  She is concerned about her father, who is currently in [place].  She recently went through a break-up, and is ambivalent about whether or not she wants to pursue this relationship any further.      ICD-10-CM    1. Bipolar II disorder (H) F31.81 lamoTRIgine (LAMICTAL) 100 MG tablet       Medical comorbidities include:   Patient Active Problem List   Diagnosis     RA (rheumatoid arthritis) (H)     Bipolar II disorder (H)     Generalized anxiety disorder       Treatment Plan:  Patient Instructions   Increase lamotrigine to 200 mg a day after one week of being on 150 mg.     Continue all other medications per your  primary care provider.    Schedule an appointment with me in 4 weeks or sooner as needed.  Call [facility] at [phone] to schedule or schedule at the .     [facility] Resources:      Go to the Emergency Department as needed or call after hours crisis line at [phone].      To schedule individual or family therapy, call [facility] at [phone].     Follow up with primary care provider as planned or sooner for acute medical concerns.    Call the psychiatric nurse line with medication questions or concerns at [phone].    Oxxy may be used to communicate with your provider, but this is not intended to be used for emergencies.    Community Resources:      National Suicide Prevention Lifeline: 441.918.4186 (TTY: 136.986.4941). Call anytime for help.  (www.suicidepreventionlifeline.org)    National [place] [employer] [place] on Mental Illness (www.freddie.org): 857.306.3200 or 654-337-0738.     Mental Health Association (www.mentalhealth.org): 562.179.3141 or 680-810-6564.    [place] Crisis Text Line: Text [place] to 650937    Suicide LifeLine Chat: suicidepreMedlanesline.org/chat     Administrative Billing:   I spent approximately 30 minutes with this patient, greater than 16 minutes in supportive psychotherapy.    Patient Status:  Patient will continue to be seen for ongoing consultation and stabilization.

## 2020-02-26 NOTE — PATIENT INSTRUCTIONS
Increase lamotrigine to 200 mg a day after one week of being on 150 mg.     Continue all other medications per your primary care provider.    Schedule an appointment with me in 4 weeks or sooner as needed.  Call [facility] at [phone] to schedule or schedule at the .     [facility] Resources:      Go to the Emergency Department as needed or call after hours crisis line at [phone].      To schedule individual or family therapy, call [facility] at [phone].     Follow up with primary care provider as planned or sooner for acute medical concerns.    Call the psychiatric nurse line with medication questions or concerns at [phone].    [employer] may be used to communicate with your provider, but this is not intended to be used for emergencies.    Community Resources:      National Suicide Prevention Lifeline: 134.522.9513 (TTY: 138.886.6600). Call anytime for help.  (www.suicidepreventionlifeline.org)    National [place] on Mental Illness (www.freddie.org): 810.755.6277 or 054-530-3187.     Mental Health Association (www.mentalhealth.org): 973.547.7266 or 760-739-6335.    [place] Crisis Text Line: Text [place] to 198489    Suicide LifeLine Chat: suicidepreventionlifeline.org/chat

## 2020-02-27 ASSESSMENT — ANXIETY QUESTIONNAIRES: GAD7 TOTAL SCORE: 11

## 2020-03-19 ENCOUNTER — TELEPHONE (OUTPATIENT)
Dept: BEHAVIORAL HEALTH | Facility: CLINIC | Age: 25
End: 2020-03-19

## 2020-03-19 NOTE — TELEPHONE ENCOUNTER
I called to ask the patient to have a Telephone encounter on 3/25/2020 vs a in person visit.     Anny Gold MA on 3/19/2020 at 9:10 AM

## 2020-03-24 DIAGNOSIS — F33.1 MODERATE EPISODE OF RECURRENT MAJOR DEPRESSIVE DISORDER (H): ICD-10-CM

## 2020-03-24 DIAGNOSIS — F41.1 GAD (GENERALIZED ANXIETY DISORDER): ICD-10-CM

## 2020-03-24 RX ORDER — LAMOTRIGINE 25 MG/1
TABLET ORAL
Qty: 0.1 TABLET | Refills: 0 | OUTPATIENT
Start: 2020-03-24

## 2020-03-24 NOTE — TELEPHONE ENCOUNTER
HW Reception please ask patient to contact psychiatry or pharmacy as they have prescribed the previous prescription and dose for the patient    Refused Prescriptions:                       Disp   Refills    lamoTRIgine (LAMICTAL) 25 MG tablet [Pharm*0.1 ta*0        Sig: TAKE 1 TABLET(25 MG) BY MOUTH DAILY  Refused By: TERELL ROBB  Reason for Refusal: Originating/Specialty Provider to approve

## 2020-03-24 NOTE — TELEPHONE ENCOUNTER
"Requested Prescriptions   Pending Prescriptions Disp Refills     lamoTRIgine (LAMICTAL) 25 MG tablet [Pharmacy Med Name: LAMOTRIGINE 25MG TABLETS] 60 tablet 1     Sig: TAKE 1 TABLET(25 MG) BY MOUTH DAILY  Not on current med list   Last Office Visit: 11/15/2019   Future Office Visit:    Next 5 appointments (look out 90 days)    Apr 28, 2020  3:15 PM CDT  Return Visit with Lori Alaniz MD  [facility] ([facility]) [address] [place] [id number] [phone]              Anti-Seizure Meds Protocol  Failed - 3/24/2020  3:50 AM        Failed - Review Authorizing provider's last note.      Refer to last progress notes: confirm request is for original authorizing provider (cannot be through other providers).        Failed - Normal ALT or AST on file in past 26 months     No lab results found.  No lab results found.          Passed - Recent (12 mo) or future (30 days) visit within the authorizing provider's specialty     Patient has had an office visit with the authorizing provider or a provider within the authorizing providers department within the previous 12 mos or has a future within next 30 days. See \"Patient Info\" tab in inbasket, or \"Choose Columns\" in Meds & Orders section of the refill encounter.            Passed - Normal CBC on file in past 26 months     Recent Labs   Lab Test 11/15/19  0936 07/15/19  2019   WBC  --  8.2   RBC  --  4.73   HGB 14.5 14.7   HCT  --  43.1   PLT  --  291           Passed - Normal platelet count on file in past 26 months     Recent Labs   Lab Test 07/15/19  2019              Passed - Medication is active on med list        Passed - No active pregnancy on record        Passed - No positive pregnancy test in last 12 months             "

## 2020-03-30 DIAGNOSIS — F31.81 BIPOLAR II DISORDER (H): ICD-10-CM

## 2020-03-30 NOTE — TELEPHONE ENCOUNTER
"Requested Prescriptions   Pending Prescriptions Disp Refills     lamoTRIgine (LAMICTAL) 100 MG tablet 60 tablet 2     Sig: Take 2 tablets (200 mg) by mouth daily   Last Written Prescription Date:  2-  Last Fill Quantity: 60,  # refills: 2   Last office visit: No previous visit found with prescribing provider:  2-   Future Office Visit:   Next 5 appointments (look out 90 days)    Apr 28, 2020  3:15 PM CDT  Return Visit with Lori Alaniz MD  [facility] ([facility]) [address] [place] [id number] [phone]             Anti-Seizure Meds Protocol  Failed - 3/30/2020  4:46 PM        Failed - Review Authorizing provider's last note.      Refer to last progress notes: confirm request is for original authorizing provider (cannot be through other providers).          Failed - Normal ALT or AST on file in past 26 months     No lab results found.  No lab results found.          Passed - Recent (12 mo) or future (30 days) visit within the authorizing provider's specialty     Patient has had an office visit with the authorizing provider or a provider within the authorizing providers department within the previous 12 mos or has a future within next 30 days. See \"Patient Info\" tab in inbasket, or \"Choose Columns\" in Meds & Orders section of the refill encounter.              Passed - Normal CBC on file in past 26 months     Recent Labs   Lab Test 11/15/19  0936 07/15/19  2019   WBC  --  8.2   RBC  --  4.73   HGB 14.5 14.7   HCT  --  43.1   PLT  --  291                 Passed - Normal platelet count on file in past 26 months     Recent Labs   Lab Test 07/15/19  2019                  Passed - Medication is active on med list        Passed - No active pregnancy on record        Passed - No positive pregnancy test in last 12 months             "

## 2020-03-31 RX ORDER — LAMOTRIGINE 100 MG/1
200 TABLET ORAL DAILY
Qty: 60 TABLET | Refills: 2 | OUTPATIENT
Start: 2020-03-31

## 2020-04-06 ENCOUNTER — VIRTUAL VISIT (OUTPATIENT)
Dept: FAMILY MEDICINE | Facility: OTHER | Age: 25
End: 2020-04-06

## 2020-04-06 NOTE — PROGRESS NOTES
"Date: 2020 12:43:38  Clinician: Bull Pelletier  Clinician NPI: [id number]  Patient: Loraine [id number]  Patient : 1995  Patient Address: [facility] [place] [address]  Patient Phone: [phone]  Visit Protocol: URI  Patient Summary:  Loraine is a 25 year old ( : 1995 ) female who initiated a Visit for COVID-19 (Coronavirus) evaluation and screening. When asked the question \"Please sign me up to receive news, health information and promotions from [employer].\", Loraine responded \"No\".    Loraine states her symptoms started gradually 3-6 days ago.   Her symptoms consist of rhinitis, a sore throat, a cough, nasal congestion, and malaise. She is experiencing mild difficulty breathing with activities but can speak normally in full sentences.   Symptom details     Nasal secretions: The color of her mucus is clear and white.    Cough: Loraine coughs every 5-10 minutes and her cough is more bothersome at night. Phlegm does not come into her throat when she coughs. She does not believe her cough is caused by post-nasal drip.     Sore throat: Loraine reports having mild throat pain (1-3 on a 10 point pain scale), does not have exudate on her tonsils, and can swallow liquids. She is not sure if the lymph nodes in her neck are enlarged. A rash has not appeared on the skin since the sore throat started.      Loraine denies having facial pain or pressure, myalgias, ear pain, chills, diarrhea, vomiting, nausea, teeth pain, headache, fever, and wheezing. She also denies taking antibiotic medication for the symptoms, having recent facial or sinus surgery in the past 60 days, and double sickening (worsening symptoms after initial improvement).   Precipitating events  Within the past week, Loraine has not been exposed to someone with strep throat. She has not recently been exposed to someone with influenza. Loraine has not been in close contact with any high risk individuals.   Pertinent " COVID-19 (Coronavirus) information  Loraine has not traveled internationally or to the areas where COVID-19 (Coronavirus) is widespread, including cruise ship travel in the last 14 days before the start of her symptoms.   Loraine has not had a close contact with a laboratory-confirmed COVID-19 patient within 14 days of symptom onset. She also has not had a close contact with a suspected COVID-19 patient within 14 days of symptom onset.   Loraine does not work or volunteer as healthcare worker or a  and does not work or volunteer in a healthcare facility. She does not live with a healthcare worker.   Pertinent medical history  Loraine does not get yeast infections when she takes antibiotics.   Loraine needs a return to work/school note.   Weight: 145 lbs   Loraine does not smoke or use smokeless tobacco.   She denies pregnancy and denies breastfeeding. She does not menstruate.   Weight: 145 lbs    MEDICATIONS: Valtrex oral, lamotrigine oral, ALLERGIES: NKDA  Clinician Response:  Dear Loraine,   Based on the information you have provided, you do have symptoms that are consistent with Coronavirus (COVID-19).   The coronavirus causes mild to severe respiratory illness with the most common symptoms including fever, cough and difficulty breathing. Unfortunately, many viruses cause similar symptoms and it can be difficult to distinguish between viruses, especially in mild cases, so we are presuming that anyone with cough or fever has coronavirus at this time.  Coronavirus/COVID-19 has reached the point of community spread in [place], meaning that we are finding the virus in people with no known exposure risk for sugey the virus. Given the increasing commonness of coronavirus in the community we are no longer testing patients who are not critically ill.  If you are a health care worker, you should refer to your employee health office for instructions about testing and returning to work.   For everyone else who has cough or fever, you should assume you are infected with coronavirus. Since you will not be tested but have symptoms that may be consistent with coronavirus, the CDC recommends you stay in self-isolation until these three things have happened:    You have had no fever for at least 72 hours (that is three full days of no fever without the use of medicine that reduces fevers)    AND   Other symptoms have improved (for example, when your cough or shortness of breath have improved)   AND   At least 7 days have passed since your symptoms first appeared.   How to Isolate:    Isolate yourself at home.   Do Not allow any visitors  Do Not go to work or school  Do Not go to [employer],  centers, shopping, or other public places.  Do Not shake hands.  Avoid close contact with others (hugging, kissing).   Protect Others:    Cover Your Mouth and Nose with a mask, disposable tissue or wash cloth to avoid spreading germs to others.  Wash your hands and face frequently with soap and water.   Managing Symptoms:    At this time, we primarily recommend Tylenol (Acetaminophen) for fever or pain. If you have liver or kidney problems, contact your primary care provider for instructions on use of tylenol. Adults can take 650 mg (two 325 mg pills) by mouth every 4-6 hours as needed OR 1,000 mg (two 500 mg pills) every 8 hours as needed. MAXIMUM DAILY DOSE: 3,000mg. For children, refer to dosing on bottle based on age or weight.   If you develop significant shortness of breath that prevents you from doing normal activities, please call 911 or proceed to the nearest emergency room and alert them immediately that you have been in self-isolation for possible coronavirus.   If you have a higher risk medical condition such as cancer, heart failure, end stage renal disease on dialysis or have a transplant, please reach out to your specialist's clinic to advise them of your [facility] visit should you not improve within  the next two days.  For more information about COVID19 and options for caring for yourself at home, please visit the CDC website at https://www.cdc.gov/coronavirus/2019-ncov/about/steps-when-sick.htmlFor more options for care at [facility], please visit our website at [email or web address]         Since you will not be tested but have symptoms that may be consistent with coronavirus, the CDC recommends you stay in self-isolation until these three things have happened:   You have had no fever for at least 72 hours (that is three full days of no fever without the use of medicine that reduces fevers)   AND   Other symptoms have improved (for example, when your cough or shortness of breath have improved)   AND   At least 7 days have passed since your symptoms first appeared           Diagnosis: Acute upper respiratory infection, unspecified  Diagnosis ICD: J06.9

## 2020-05-27 ENCOUNTER — VIRTUAL VISIT (OUTPATIENT)
Dept: PSYCHOLOGY | Facility: CLINIC | Age: 25
End: 2020-05-27
Payer: COMMERCIAL

## 2020-05-27 DIAGNOSIS — F41.1 GENERALIZED ANXIETY DISORDER: ICD-10-CM

## 2020-05-27 DIAGNOSIS — F31.81 BIPOLAR II DISORDER (H): Primary | ICD-10-CM

## 2020-05-27 PROCEDURE — 99213 OFFICE O/P EST LOW 20 MIN: CPT | Mod: TEL | Performed by: PSYCHIATRY & NEUROLOGY

## 2020-05-27 RX ORDER — LAMOTRIGINE 200 MG/1
200 TABLET ORAL DAILY
Qty: 90 TABLET | Refills: 1 | Status: SHIPPED | OUTPATIENT
Start: 2020-05-27 | End: 2020-10-01

## 2020-05-27 ASSESSMENT — ANXIETY QUESTIONNAIRES
GAD7 TOTAL SCORE: 16
3. WORRYING TOO MUCH ABOUT DIFFERENT THINGS: MORE THAN HALF THE DAYS
1. FEELING NERVOUS, ANXIOUS, OR ON EDGE: NEARLY EVERY DAY
2. NOT BEING ABLE TO STOP OR CONTROL WORRYING: MORE THAN HALF THE DAYS
7. FEELING AFRAID AS IF SOMETHING AWFUL MIGHT HAPPEN: SEVERAL DAYS
IF YOU CHECKED OFF ANY PROBLEMS ON THIS QUESTIONNAIRE, HOW DIFFICULT HAVE THESE PROBLEMS MADE IT FOR YOU TO DO YOUR WORK, TAKE CARE OF THINGS AT HOME, OR GET ALONG WITH OTHER PEOPLE: SOMEWHAT DIFFICULT
5. BEING SO RESTLESS THAT IT IS HARD TO SIT STILL: MORE THAN HALF THE DAYS
6. BECOMING EASILY ANNOYED OR IRRITABLE: NEARLY EVERY DAY

## 2020-05-27 ASSESSMENT — PATIENT HEALTH QUESTIONNAIRE - PHQ9
5. POOR APPETITE OR OVEREATING: NEARLY EVERY DAY
SUM OF ALL RESPONSES TO PHQ QUESTIONS 1-9: 11

## 2020-05-27 NOTE — PATIENT INSTRUCTIONS
Continue lamotrigine 200 mg daily.    Continue all other medications per your primary care provider.    Continue individual therapy.    Schedule an appointment with me in 3 months or sooner as needed.  You may call [facility] at [phone] to schedule, or schedule at the .    [facility] Resources:      Go to the Emergency Department as needed or call after hours crisis line at [phone].      To schedule individual or family therapy, call [facility] at [phone].     Follow up with primary care provider as planned or sooner for acute medical concerns.    Call the psychiatric nurse line with medication questions or concerns at [phone].    [employer] may be used to communicate with your provider, but this is not intended to be used for emergencies.    Community Resources:      National Suicide Prevention Lifeline: 286.533.5632 (TTY: 466.503.4141). Call anytime for help.  (www.suicidepreventionlifeline.org)    National [place] on Mental Illness (www.freddie.org): 394.609.5787 or 029-882-6693.     Mental Health Association (www.mentalhealth.org): 962.693.7292 or 996-612-0269.    [place] Crisis Text Line: Text [place] to 537214    Suicide LifeLine Chat: [email or web address] Pharmalifeline.org/chat

## 2020-05-27 NOTE — PROGRESS NOTES
"Loraine Davis is a 25 year old female who is being evaluated via a billable telephone visit.      The patient has been notified of following:     \"This telephone visit will be conducted via a call between you and your physician/provider. We have found that certain health care needs can be provided without the need for a physical exam.  This service lets us provide the care you need with a short phone conversation.  If a prescription is necessary we can send it directly to your pharmacy.  If lab work is needed we can place an order for that and you can then stop by our lab to have the test done at a later time.    Telephone visits are billed at different rates depending on your insurance coverage. During this emergency period, for some insurers they may be billed the same as an in-person visit.  Please reach out to your insurance provider with any questions.    If during the course of the call the physician/provider feels a telephone visit is not appropriate, you will not be charged for this service.\"    Patient has given verbal consent for Telephone visit?  Yes    What phone number would you like to be contacted at? [phone]    How would you like to obtain your AVS? Mail a copy          Outpatient Psychiatric Progress Note    Name: Loraine Davis   : 1995                    Primary Care Provider: Maddie Colindres MD   Therapist: [facility]  Status: Continuous care patient    PHQ-9 scores:  PHQ-9 SCORE 2020   PHQ-9 Total Score 19 12 11       NICHO-7 scores:  NICHO-7 SCORE 2020   Total Score 20 11 16       Patient Identification:  Loraine is a 25 year old year old, single  White American female  who presents for return visit with me.  Patient attended the session alone.     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  There are fewer people at work which has been pleasant for her.  She does say that she misses \"physical contact.\"    Her father is stuck in [place] for the foreseeable future.  If he leaves there, he will be able to return, and he has business interest there as well as a family.  She does have contact with him on a daily basis.  She reports that she still dealing with her recent break-up.  She sees her past boyfriend at work, and wants to try to help him, although she knows that she cannot.  She says that he is her best friend, so she really misses him.    She moved in with her aunt and HER-2 cousins who are 14 and 16 years old.  Her aunt recently , so they are able to support one another.  In addition, her 16-year-old cousin is struggling with depression in the wake of the divorce, and Loraine has been spending a lot of time with her.    She is taking 20 mg a day of lamotrigine.  She rates her mood as 8 or 9 out of 10 with 10 being the best.  She denies any adverse side effects.  She has been sleeping well even without Ambien.  She denies any manic episodes.  Her anxiety level is still high, but she is trying to practice mindfulness and meditation.  She has not seen her therapist recently, encouraged her to make contact and work directly on the anxiety.    She has not been using alcohol    She feels that she is pretty stable at this point, but does not really have an established primary care provider at [facility].  We agreed that she could continue under my care.    Vital Signs:   There were no vitals taken for this visit.    Current Medications:  Current Outpatient Medications   Medication     lamoTRIgine (LAMICTAL) 200 MG tablet     levonorgestrel (MIRENA) 20 MCG/24HR IUD     valACYclovir (VALTREX) 500 MG tablet     No current facility-administered medications for this visit.         Mental Status Examination:  Loraine is a 25-year-old woman in no " acute distress.  Speech is clear and normal in rate and tone.  Mood is anxious.  Thoughts are logical and spontaneous with no loose associations or flight of ideas.  Thought content shows no suicidal ideation.  She is alert and oriented x3.    Assessment and Plan:    ICD-10-CM    1. Bipolar II disorder (H)  F31.81 lamoTRIgine (LAMICTAL) 200 MG tablet   2. Generalized anxiety disorder  F41.1        Medical comorbidities include:   Patient Active Problem List   Diagnosis     RA (rheumatoid arthritis) (H)     Bipolar II disorder (H)     Generalized anxiety disorder       Treatment Plan:  Patient Instructions   Continue lamotrigine 200 mg daily.    Continue all other medications per your primary care provider.    Continue individual therapy.    Schedule an appointment with me in 3 months or sooner as needed.  You may call [facility] at [phone] to schedule, or schedule at the .    [facility] Resources:      Go to the Emergency Department as needed or call after hours crisis line at [phone].      To schedule individual or family therapy, call [facility] at [phone].     Follow up with primary care provider as planned or sooner for acute medical concerns.    Call the psychiatric nurse line with medication questions or concerns at [phone].    [employer]t may be used to communicate with your provider, but this is not intended to be used for emergencies.    Community Resources:      National Suicide Prevention Lifeline: 654.868.8887 (TTY: 534.161.9920). Call anytime for help.  (www.suicidepreventionlifeline.org)    National [place] on Mental Illness (www.freddie.org): 112.723.1428 or 075-972-5819.     Mental Health Association (www.mentalhealth.org): 250.239.1384 or 091-310-3491.    [place] Crisis Text Line: Text [place] to 055523    Suicide LifeLine Chat: suicidepredev9kline.org/chat       Administrative Billing:   Phone call duration: 16 minutes, greater than 50%  spent in counseling regarding medications, treatment options, anxiety.    Patient Status:  This is a continuous care patient and medications will be prescribed by the psychiatric provider until further indicated.

## 2020-05-28 ASSESSMENT — ANXIETY QUESTIONNAIRES: GAD7 TOTAL SCORE: 16

## 2020-06-29 ENCOUNTER — TELEPHONE (OUTPATIENT)
Dept: BEHAVIORAL HEALTH | Facility: CLINIC | Age: 25
End: 2020-06-29

## 2020-06-29 NOTE — TELEPHONE ENCOUNTER
"Lamotrigine 100mg tab refill request from [facility].     Per Dr Alaniz's last tx plan 5/27/20, patient is taking lamotrigine 200mg daily; active refills. Confirmed with pharmacy. No refills needed at this time.     \"Continue lamotrigine 200 mg daily.     Continue all other medications per your primary care provider.     Continue individual therapy.     Schedule an appointment with me in 3 months or sooner as needed.  You may call [facility] at [phone] to schedule, or schedule at the .\"    Lindsay Porter RN on 6/29/2020 at 10:45 AM    "

## 2020-06-29 NOTE — TELEPHONE ENCOUNTER
[facility] faxed Refill Request for     lamoTRIgine (LAMICTAL) 100 MG tablet      Last Written Prescription Date:  1/27/2020  Last Fill Quantity: 45 tablet,   # refills: 1  Last Office Visit:: 5/27/2020 w/ WILI Alaniz  Future Office visit:       Routing refill request to provider for review/approval because:  Drug not active on patient's medication list

## 2020-08-18 DIAGNOSIS — F31.81 BIPOLAR II DISORDER (H): Primary | ICD-10-CM

## 2020-08-18 NOTE — TELEPHONE ENCOUNTER
She can restart Ambien at her previous dose.  Please confirm she is OK with that, and we will send  #30 to be used as needed.    Thanks,  Alexandra Alaniz MD  Collaborative Care Psychiatry  [facility]

## 2020-08-18 NOTE — TELEPHONE ENCOUNTER
Patient complaining of nightmares impacting sleep starting about one month ago intermittently, but the last week they have been occurring daily. No recent changes to medications or routines. Upcoming appointment 8/27/20. Routing to provider.

## 2020-08-18 NOTE — TELEPHONE ENCOUNTER
Reason for call:  Patient reporting a symptom    Symptom or request: Pt reporting vivid nightmares over the past week. Pt states that there are no major life changes, no change in diet or excursize. Pt wondering if there are any recommendations for medication adjustments. Pt hasn't slept more than a couple hours over the past week.      Duration (how long have symptoms been present): Week    Have you been treated for this before? No    Additional comments: none    Phone Number patient can be reached at:  Cell number on file:    Telephone Information:   Mobile [phone]       Best Time:  any    Can we leave a detailed message on this number:  YES    Call taken on 8/18/2020 at 4:01 PM by Rashid Puri

## 2020-08-19 ENCOUNTER — VIRTUAL VISIT (OUTPATIENT)
Dept: FAMILY MEDICINE | Facility: OTHER | Age: 25
End: 2020-08-19
Payer: COMMERCIAL

## 2020-08-19 PROCEDURE — 99421 OL DIG E/M SVC 5-10 MIN: CPT | Performed by: EMERGENCY MEDICINE

## 2020-08-19 RX ORDER — ZOLPIDEM TARTRATE 5 MG/1
5 TABLET ORAL
Qty: 30 TABLET | Refills: 0 | Status: SHIPPED | OUTPATIENT
Start: 2020-08-19 | End: 2020-10-01

## 2020-08-19 NOTE — TELEPHONE ENCOUNTER
"Spoke with patient.  States that last night she did take an Ambien 5mg that she had left over because she \"just needed to sleep to be functional for work\".   Patient reports taking it at 10pm and woke up at 11:30pm, 2am and 4am-- \"unsure if that means it won't work or if it was just old\"    Pharmacy confirmed with patient.   Routing to provider.    Nidia Oconnell, RN    Nurse Liaison  [facility]    "

## 2020-08-19 NOTE — TELEPHONE ENCOUNTER
I doubt the Ambien was so old that it didn't work at all.  I would recommend continuing it for at least a week to restablish her sleep.    Thanks,  Alexandra Alaniz MD  Collaborative Care Psychiatry  [facility]

## 2020-08-19 NOTE — TELEPHONE ENCOUNTER
Patient wanted new Rx-- only had a couple left over.    Medication pended, routing to provider.    Nidia Oconnell, RN    Nurse Liaison  [facility]

## 2020-08-19 NOTE — PROGRESS NOTES
"Date: 2020 16:13:21  Clinician: Danny Swain  Clinician NPI: [id number]  Patient: Loraine Kimvac  Patient : 1995  Patient Address: [address] [place], [place] [address] [address]  Patient Phone: [phone]  Visit Protocol: URI  Patient Summary:  Loraine is a 25 year old ( : 1995 ) female who initiated a Visit for COVID-19 (Coronavirus) evaluation and screening. When asked the question \"Please sign me up to receive news, health information and promotions from [employer].\", Loraine responded \"No\".    When asked when her symptoms started, Loraine reported that she does not have any symptoms.   She denies having recent facial or sinus surgery in the past 60 days and taking antibiotic medication in the past month.    Pertinent COVID-19 (Coronavirus) information  In the past 14 days, Loraine has not worked in a congregate living setting.   She does not work or volunteer as healthcare worker or a  and does not work or volunteer in a healthcare facility.   Loraine also has not lived in a congregate living setting in the past 14 days. She does not live with a healthcare worker.   Loraine has not had a close contact with a laboratory-confirmed COVID-19 patient within the last 14 days.   Since 2019, Loraine and has had upper respiratory infection (URI) or influenza-like illness. Has not been diagnosed with lab-confirmed COVID-19 test      Date(s) of previous URI or influenza-like illness (free-text): -     Symptoms Loraine experienced during previous URI or influenza-like illness as reported by the patient (free-text): cough, chest tightness, shortness of breath, body aches, fatigue        Pertinent medical history  Loraine does not get yeast infections when she takes antibiotics.   Loraine needs a return to work/school note.   Weight: 130 lbs   Loraine does not smoke or use smokeless tobacco.   She denies pregnancy and denies breastfeeding. She does not " menstruate.   Additional information as reported by the patient (free text): Last had contact on 8/4 with someone who currently has covid like symptoms (but they tested negative). Their symptoms started on 8/12.   Weight: 130 lbs    MEDICATIONS: Valtrex oral, lamotrigine oral, ALLERGIES: NKDA  Clinician Response:  Dear Loraine,     Based on the details you've shared, you may have been exposed to coronavirus (COVID-19). You do not need to be tested at this time.  What should I do?  For safety, it's very important to follow these rules. Do this for 14 days after the date you were last exposed to the virus.  Stay home and away from others. Don't go to work, school or anywhere else.  No hugging, kissing or shaking hands.  Don't let anyone visit.  Cover your mouth and nose with a mask, tissue or washcloth to avoid spreading germs.  Wash your hands and face often. Use soap and water.  What are the symptoms of COVID-19?  The most common symptoms are cough, fever and trouble breathing. Less common symptoms include headache, body aches, fatigue (feeling very tired), chills, sore throat, stuffy or runny nose, diarrhea (loose poop), loss of taste or smell, belly pain, and nausea or vomiting (feeling sick to your stomach or throwing up).  After 14 days, if you have still don't have symptoms, you likely don't have this virus.  If you develop symptoms, follow these guidelines.  If you're normally healthy: Please start another [facility] visit to report your symptoms. Go to [email or web address].  If you have a serious health problem (like cancer, heart failure, an organ transplant or kidney disease): Call your specialty clinic. Let them know that you might have COVID-19.  Where can I get more information?    [facility] -- About COVID-19: [email or web address]  CDC -- What to Do If You're Sick: www.cdc.gov/coronavirus/2019-ncov/about/steps-when-sick.html  CDC -- Ending Home Isolation:  www.cdc.gov/coronavirus/2019-ncov/hcp/disposition-in-home-patients.html  [facility] -- Caring for Someone: www.cdc.gov/coronavirus/2019-ncov/if-you-are-sick/care-for-someone.html  [facility] -- Interim Guidance for Hospital Discharge to Home: www.health.[facility].[place].us/diseases/coronavirus/hcp/hospdischarge.pdf  [facility] clinical trials (COVID-19 research studies): clinicalaffairs.[facility]-clinical-trials  Below are the COVID-19 hotlines at the [place] Department of Health ([facility]). Interpreters are available.   For health questions: Call 755-237-4954 or 1-620.676.1290 (7 a.m. to 7 p.m.) For questions about schools and childcare: Call 823-030-4364 or 1-812.589.5106 (7 a.m. to 7 p.m.)     .    Based on the details you've shared, you may have been exposed to coronavirus (COVID-19). You do not need to be tested at this time.  What should I do?  For safety, it's very important to follow these rules. Do this for 14 days after the date you were last exposed to the virus.  Stay home and away from others. Don't go to work, school or anywhere else.  No hugging, kissing or shaking hands.  Don't let anyone visit.  Cover your mouth and nose with a mask, tissue or washcloth to avoid spreading germs.  Wash your hands and face often. Use soap and water.  What are the symptoms of COVID-19?  The most common symptoms are cough, fever and trouble breathing. Less common symptoms include headache, body aches, fatigue (feeling very tired), chills, sore throat, stuffy or runny nose, diarrhea (loose poop), loss of taste or smell, belly pain, and nausea or vomiting (feeling sick to your stomach or throwing up).  After 14 days, if you have still don't have symptoms, you likely don't have this virus.  If you develop symptoms, follow these guidelines.  If you're normally healthy: Please start another [facility] visit to report your symptoms. Go to [email or web address].  If you have a serious health problem (like cancer, heart failure, an organ transplant or  kidney disease): Call your specialty clinic. Let them know that you might have COVID-19.  Where can I get more information?    [facility] -- About COVID-19: [email or web address]  CDC -- What to Do If You're Sick: www.cdc.gov/coronavirus/2019-ncov/about/steps-when-sick.html  CDC -- Ending Home Isolation: www.cdc.gov/coronavirus/2019-ncov/hcp/disposition-in-home-patients.html  [facility] -- Caring for Someone: www.cdc.gov/coronavirus/2019-ncov/if-you-are-sick/care-for-someone.html  [facility] -- Interim Guidance for Hospital Discharge to Home: www.[facility].mn.us/diseases/coronavirus/hcp/hospdischarge.pdf  [facility] clinical trials (COVID-19 research studies): clinicalaffairs.[facility]-clinical-trials  Below are the COVID-19 hotlines at the [facility] of Health ([facility]). Interpreters are available.   For health questions: Call 634-430-9347 or 1-888.410.3469 (7 a.m. to 7 p.m.) For questions about schools and childcare: Call 048-772-7158 or 1-892.653.4139 (7 a.m. to 7 p.m.)     .      Diagnosis: Cough  Diagnosis ICD: R05

## 2020-08-27 ENCOUNTER — VIRTUAL VISIT (OUTPATIENT)
Dept: PSYCHOLOGY | Facility: CLINIC | Age: 25
End: 2020-08-27
Payer: COMMERCIAL

## 2020-08-27 DIAGNOSIS — F41.1 GENERALIZED ANXIETY DISORDER: ICD-10-CM

## 2020-08-27 DIAGNOSIS — F31.81 BIPOLAR II DISORDER (H): Primary | ICD-10-CM

## 2020-08-27 PROCEDURE — 99214 OFFICE O/P EST MOD 30 MIN: CPT | Mod: TEL | Performed by: PSYCHIATRY & NEUROLOGY

## 2020-08-27 RX ORDER — HYDROXYZINE PAMOATE 25 MG/1
25 CAPSULE ORAL 3 TIMES DAILY PRN
Qty: 60 CAPSULE | Refills: 1 | Status: SHIPPED | OUTPATIENT
Start: 2020-08-27

## 2020-08-27 RX ORDER — LAMOTRIGINE 25 MG/1
50 TABLET ORAL DAILY
Qty: 60 TABLET | Refills: 2 | Status: SHIPPED | OUTPATIENT
Start: 2020-08-27 | End: 2020-10-01 | Stop reason: ALTCHOICE

## 2020-08-27 ASSESSMENT — ANXIETY QUESTIONNAIRES
7. FEELING AFRAID AS IF SOMETHING AWFUL MIGHT HAPPEN: MORE THAN HALF THE DAYS
6. BECOMING EASILY ANNOYED OR IRRITABLE: MORE THAN HALF THE DAYS
IF YOU CHECKED OFF ANY PROBLEMS ON THIS QUESTIONNAIRE, HOW DIFFICULT HAVE THESE PROBLEMS MADE IT FOR YOU TO DO YOUR WORK, TAKE CARE OF THINGS AT HOME, OR GET ALONG WITH OTHER PEOPLE: VERY DIFFICULT
3. WORRYING TOO MUCH ABOUT DIFFERENT THINGS: MORE THAN HALF THE DAYS
GAD7 TOTAL SCORE: 16
2. NOT BEING ABLE TO STOP OR CONTROL WORRYING: NEARLY EVERY DAY
5. BEING SO RESTLESS THAT IT IS HARD TO SIT STILL: SEVERAL DAYS
1. FEELING NERVOUS, ANXIOUS, OR ON EDGE: NEARLY EVERY DAY

## 2020-08-27 ASSESSMENT — PATIENT HEALTH QUESTIONNAIRE - PHQ9
5. POOR APPETITE OR OVEREATING: NEARLY EVERY DAY
SUM OF ALL RESPONSES TO PHQ QUESTIONS 1-9: 17

## 2020-08-27 NOTE — PATIENT INSTRUCTIONS
Increase lamotrigine to 50 mg in the morning and 200 mg at bedtime.    Start hydroxyzine 25 mg up to 3 times a day as needed for anxiety.    Continue all other medications per your primary care provider.    Schedule an appointment with me in 4 weeks or sooner as needed.  You may call [facility] at [phone] to schedule, or schedule at the .    [facility] Resources:      Go to the Emergency Department as needed or call after hours crisis line at [phone].      To schedule individual or family therapy, call [facility] at [phone].     Follow up with primary care provider as planned or sooner for acute medical concerns.    Call the psychiatric nurse line with medication questions or concerns at [phone].    [employer] may be used to communicate with your provider, but this is not intended to be used for emergencies.    Community Resources:      National Suicide Prevention Lifeline: 681.580.2112 (TTY: 258.680.5779). Call anytime for help.  (www.suicidepreventionlifeline.org)    National [place] on Mental Illness (www.freddie.org): 667.897.3428 or 649-135-9596.     Mental Health Association (www.mentalhealth.org): 547.524.4362 or 401-961-4655.    [place] Crisis Text Line: Text [place] to 978438    Suicide LifeLine Chat: suicideOzmosisline.org/chat

## 2020-08-27 NOTE — PROGRESS NOTES
"Loraine Davis is a 25 year old female who is being evaluated via a billable telephone visit.      The patient has been notified of following:     \"This telephone visit will be conducted via a call between you and your physician/provider. We have found that certain health care needs can be provided without the need for a physical exam.  This service lets us provide the care you need with a short phone conversation.  If a prescription is necessary we can send it directly to your pharmacy.  If lab work is needed we can place an order for that and you can then stop by our lab to have the test done at a later time.    Telephone visits are billed at different rates depending on your insurance coverage. During this emergency period, for some insurers they may be billed the same as an in-person visit.  Please reach out to your insurance provider with any questions.    If during the course of the call the physician/provider feels a telephone visit is not appropriate, you will not be charged for this service.\"    Patient has given verbal consent for Telephone visit?  Yes    What phone number would you like to be contacted at? [phone]    How would you like to obtain your AVS? Mail a copy         Outpatient Psychiatric Progress Note    Name: Loraine Davis   : 1995                    Primary Care Provider: Maddie Colindres MD   Therapist: [facility]  Status: Continuous care patient    PHQ-9 scores:  PHQ-9 SCORE 2020   PHQ-9 Total Score 12 11 17       NICHO-7 scores:  NICHO-7 SCORE 2020   Total Score 11 16 16       Patient Identification:  Loraine is a 25 year old year old, single  White American female  who presents for return visit with me.  Patient attended the session alone.     Interim History:  Terry reports that she is \"hanging in there,\" but is really not great.  She reports that she is having a lot of of work " stress.  She is the  at her company and is trying to implement a lot of new procedures and work out problems.  She spends a lot of time thinking about work even when she is not there.    Her ex-boyfriend has checked himself into a residential treatment program, and they have been talking on a fairly regular basis.  She would like to have some sort of relationship with him, but at this point is not sure exactly what that relationship will look like.    She seems to have had a possible hypomanic episode for about 7 days over the last week, and is now feeling more down or depressed.  She says that she did not sleep for more than 4 to 5 hours a night, was super productive at work, and had an elevated mood.  She did not engage in any risky behaviors.  Ambien was helpful for restoring her sleep, and she will keep some on hand for the future.      Although she is feeling more down, she says that anxiety is her more troublesome symptom.  We discussed several options for treating it, including quetiapine, which caused excessive sedation in the past.  We agreed that we will continue to avoid antidepressants.  We agreed to increase her lamotrigine to 50 mg in the morning and 200 mg at bedtime.  She was also given hydroxyzine to use as needed.    She does have multiple psychosocial stressors going on.  Her father is still in [place], her younger brother recently left for college, she had a very short-term relationship that has now ended and she is disappointed about it.  She is also considering returning to school, but has never done well with online courses, so is feeling like a failure.    Vital Signs:   No vital signs taken for this encounter.    Current Medications:  Current Outpatient Medications   Medication     hydrOXYzine (VISTARIL) 25 MG capsule     lamoTRIgine (LAMICTAL) 200 MG tablet     lamoTRIgine (LAMICTAL) 25 MG tablet     levonorgestrel (MIRENA) 20 MCG/24HR IUD     valACYclovir (VALTREX) 500 MG  tablet     zolpidem (AMBIEN) 5 MG tablet     No current facility-administered medications for this visit.       Mental Status Examination:  Terry is a 25-year-old woman in no acute distress.  Speech is clear and normal in rate and tone.  Mood is mildly depressed and anxious.  Thoughts are logical and spontaneous with no loose associations or flight of ideas.  Thought content shows no psychosis.  No suicidal thoughts.  She is alert and oriented x3.    Assessment and Plan:    ICD-10-CM    1. Bipolar II disorder (H)  F31.81 lamoTRIgine (LAMICTAL) 25 MG tablet   2. Generalized anxiety disorder  F41.1 hydrOXYzine (VISTARIL) 25 MG capsule       Medical comorbidities include:   Patient Active Problem List   Diagnosis     RA (rheumatoid arthritis) (H)     Bipolar II disorder (H)     Generalized anxiety disorder       Treatment Plan:  There are no Patient Instructions on file for this visit.     Administrative Billing:   Phone call duration: 33 minutes, greater than 50% spent in counseling regarding current symptoms, medications, and treatment options.    Patient Status:  This is a continuous care patient and medications will be prescribed by the psychiatric provider until further indicated.

## 2020-08-28 ASSESSMENT — ANXIETY QUESTIONNAIRES: GAD7 TOTAL SCORE: 16

## 2020-09-08 ENCOUNTER — ANCILLARY PROCEDURE (OUTPATIENT)
Dept: GENERAL RADIOLOGY | Facility: CLINIC | Age: 25
End: 2020-09-08
Attending: FAMILY MEDICINE
Payer: COMMERCIAL

## 2020-09-08 ENCOUNTER — OFFICE VISIT (OUTPATIENT)
Dept: URGENT CARE | Facility: URGENT CARE | Age: 25
End: 2020-09-08
Payer: COMMERCIAL

## 2020-09-08 ENCOUNTER — TELEPHONE (OUTPATIENT)
Dept: FAMILY MEDICINE | Facility: CLINIC | Age: 25
End: 2020-09-08

## 2020-09-08 VITALS — RESPIRATION RATE: 16 BRPM | OXYGEN SATURATION: 100 % | TEMPERATURE: 97.8 F | HEART RATE: 70 BPM

## 2020-09-08 DIAGNOSIS — Z87.09 HISTORY OF ASTHMA: ICD-10-CM

## 2020-09-08 DIAGNOSIS — R06.02 SOB (SHORTNESS OF BREATH): Primary | ICD-10-CM

## 2020-09-08 DIAGNOSIS — R07.9 CHEST PAIN, UNSPECIFIED TYPE: ICD-10-CM

## 2020-09-08 DIAGNOSIS — R07.1 PAINFUL RESPIRATION: ICD-10-CM

## 2020-09-08 LAB
BASOPHILS # BLD AUTO: 0 10E9/L (ref 0–0.2)
BASOPHILS NFR BLD AUTO: 0.4 %
D DIMER PPP FEU-MCNC: <0.3 UG/ML FEU (ref 0–0.5)
DIFFERENTIAL METHOD BLD: ABNORMAL
EOSINOPHIL # BLD AUTO: 0.1 10E9/L (ref 0–0.7)
EOSINOPHIL NFR BLD AUTO: 2.5 %
ERYTHROCYTE [DISTWIDTH] IN BLOOD BY AUTOMATED COUNT: 12.8 % (ref 10–15)
HCT VFR BLD AUTO: 47.1 % (ref 35–47)
HGB BLD-MCNC: 15.6 G/DL (ref 11.7–15.7)
LYMPHOCYTES # BLD AUTO: 1.9 10E9/L (ref 0.8–5.3)
LYMPHOCYTES NFR BLD AUTO: 32.9 %
MCH RBC QN AUTO: 30.6 PG (ref 26.5–33)
MCHC RBC AUTO-ENTMCNC: 33.1 G/DL (ref 31.5–36.5)
MCV RBC AUTO: 92 FL (ref 78–100)
MONOCYTES # BLD AUTO: 0.5 10E9/L (ref 0–1.3)
MONOCYTES NFR BLD AUTO: 9 %
NEUTROPHILS # BLD AUTO: 3.1 10E9/L (ref 1.6–8.3)
NEUTROPHILS NFR BLD AUTO: 55.2 %
PLATELET # BLD AUTO: 303 10E9/L (ref 150–450)
RBC # BLD AUTO: 5.1 10E12/L (ref 3.8–5.2)
WBC # BLD AUTO: 5.7 10E9/L (ref 4–11)

## 2020-09-08 PROCEDURE — U0003 INFECTIOUS AGENT DETECTION BY NUCLEIC ACID (DNA OR RNA); SEVERE ACUTE RESPIRATORY SYNDROME CORONAVIRUS 2 (SARS-COV-2) (CORONAVIRUS DISEASE [COVID-19]), AMPLIFIED PROBE TECHNIQUE, MAKING USE OF HIGH THROUGHPUT TECHNOLOGIES AS DESCRIBED BY CMS-2020-01-R: HCPCS | Performed by: FAMILY MEDICINE

## 2020-09-08 PROCEDURE — 71046 X-RAY EXAM CHEST 2 VIEWS: CPT

## 2020-09-08 PROCEDURE — 85379 FIBRIN DEGRADATION QUANT: CPT | Performed by: FAMILY MEDICINE

## 2020-09-08 PROCEDURE — 85025 COMPLETE CBC W/AUTO DIFF WBC: CPT | Performed by: FAMILY MEDICINE

## 2020-09-08 PROCEDURE — 36415 COLL VENOUS BLD VENIPUNCTURE: CPT | Performed by: FAMILY MEDICINE

## 2020-09-08 PROCEDURE — 99215 OFFICE O/P EST HI 40 MIN: CPT | Performed by: FAMILY MEDICINE

## 2020-09-08 RX ORDER — PREDNISONE 20 MG/1
20 TABLET ORAL 2 TIMES DAILY
Qty: 10 TABLET | Refills: 0 | Status: SHIPPED | OUTPATIENT
Start: 2020-09-08 | End: 2020-09-13

## 2020-09-08 NOTE — TELEPHONE ENCOUNTER
Patient states she has had SOB for 5 days.  Is getting worse  Also has dull chest pain that is more constant  Has underlying asthma and her usual inhaler is not working  Has tried taking a warm shower which seemed to make  It worse rather than helping.  Has not found anything that brings it on nor anything that makes it worse.  Does not believe she has fever but has not taken temperature.  Had appointment scheduled for 4+ hours from now but agrees to ER evaluation now.  Appointment for this afternoon canceled and patient will go to ER at [facility].  Sandi Malloy RN

## 2020-09-08 NOTE — PROGRESS NOTES
SUBJECTIVE: Loraine Davis is a 25 year old female presenting with a chief complaint of SOB/CP.  Onset of symptoms was 1 day(s) ago.  Course of illness is same.    Severity moderate  Current and Associated symptoms: pain with deep breaths  Treatment measures tried include Inhaler (name: alb).  Predisposing factors include HX of asthma and recent flying.    Past Medical History:   Diagnosis Date     Juvenile rheumatoid arthritis (H)        Past Surgical History:   Procedure Laterality Date     OVARY SURGERY         Family History   Problem Relation Age of Onset     Asthma Father      Arthritis Father      Diabetes Father      Depression Maternal Grandmother      Anxiety Disorder Maternal Grandmother      Depression Paternal Grandmother      Other - See Comments Sister      Bipolar Disorder Paternal Uncle        Social History     Tobacco Use     Smoking status: Never Smoker     Smokeless tobacco: Never Used   Substance Use Topics     Alcohol use: Yes     Frequency: 2-3 times a week     Drinks per session: 1 or 2     Binge frequency: Never     Comment: rare      No Known Allergies    Review Of Systems  Skin: negative  Eyes: negative  Ears/Nose/Throat: negative  Respiratory: no cough  Cardiovascular: as above  Gastrointestinal: negative  Genitourinary: negative  Musculoskeletal: negative  Neurologic: negative  Psychiatric: negative    OBJECTIVE:  Pulse 70   Temp 97.8  F (36.6  C)   Resp 16   SpO2 100%    GENERAL APPEARANCE: healthy, alert and no distress  EYES: EOMI,  PERRL, conjunctiva clear  HENT: ear canals and TM's normal.  Nose and mouth without ulcers, erythema or lesions  NECK: supple, nontender, no lymphadenopathy  RESP: lungs clear to auscultation - no rales, rhonchi or wheezes  CV: regular rates and rhythm, normal S1 S2, no murmur noted  ABDOMEN:  soft, nontender, no HSM or masses and bowel sounds normal  NEURO: Normal strength and tone, sensory exam grossly normal,  normal speech and  mentation  SKIN: no suspicious lesions or rashes    Xray without acute findings, no pneumonia read by Vance Frankel D.O.      ICD-10-CM    1. SOB (shortness of breath)  R06.02 CBC with platelets differential     D dimer quantitative     Symptomatic COVID-19 Virus (Coronavirus) by PCR     XR Chest 2 Views     predniSONE (DELTASONE) 20 MG tablet   2. Chest pain, unspecified type  R07.9 CBC with platelets differential     D dimer quantitative     Symptomatic COVID-19 Virus (Coronavirus) by PCR     XR Chest 2 Views     predniSONE (DELTASONE) 20 MG tablet   3. Painful respiration  R07.1 CBC with platelets differential     D dimer quantitative     Symptomatic COVID-19 Virus (Coronavirus) by PCR     XR Chest 2 Views   4. History of asthma  Z87.09 CBC with platelets differential     D dimer quantitative     Symptomatic COVID-19 Virus (Coronavirus) by PCR     XR Chest 2 Views     predniSONE (DELTASONE) 20 MG tablet     No PE by negative D Dimer even though pt with possible symptoms and risk factors of recent flying    Quarantine  PPE used  No AGP  Pt was masked  Fluids/Rest, f/u if worse/not any better  Cont inhaler

## 2020-09-08 NOTE — PATIENT INSTRUCTIONS
"Discharge Instructions for COVID-19 Patients  You have--or may have--COVID-19. Please follow the instructions listed below.   If you have a weakened immune system, discuss with your doctor any other actions you need to take.  How can I protect others?  If you have symptoms (fever, cough, body aches or trouble breathing):    Stay home and away from others (self-isolate) until:  ? At least 10 days have passed since your symptoms started. And   ? You've had no fever--and no medicine that reduces fever--for 1 full day (24 hours). And   ? Your other symptoms have resolved (gotten better).  If you don't show symptoms, but testing showed that you have COVID-19:    Stay home and away from others (self-isolate) until at least 10 days have passed since the date of your first positive COVID-19 test.  During this time    Stay in your own room, even for meals. Use your own bathroom if you can.    Stay away from others in your home. No hugging, kissing or shaking hands. No visitors.    Don't go to work, school or anywhere else.    Clean \"high touch\" surfaces often (doorknobs, counters, handles). Use household cleaning spray or wipes. You'll find a full list of  on the EPA website: www.epa.gov/pesticide-registration/list-n-disinfectants-use-against-sars-cov-2.    Cover your mouth and nose with a mask or other face covering to avoid spreading germs.    Wash your hands and face often. Use soap and water.    Caregivers in these groups are at risk for severe illness due to COVID-19:  ? People 65 years and older  ? People who live in a nursing home or long-term care facility  ? People with chronic disease (lung, heart, cancer, diabetes, kidney, liver, immunologic)  ? People who have a weakened immune system, including those who:    Are in cancer treatment    Take medicine that weakens the immune system, such as corticosteroids    Had a bone marrow or organ transplant    Have an immune deficiency    Have poorly controlled HIV or " AIDS    Are obese (body mass index of 40 or higher)    Smoke regularly    Caregivers should wear gloves while washing dishes, handling laundry and cleaning bedrooms and bathrooms.    Use caution when washing and drying laundry: Don't shake dirty laundry and use the warmest water setting that you can.    For more tips on managing your health at home, go to www.cdc.gov/coronavirus/2019-ncov/downloads/10Things.pdf.  How can I take care of myself at home?  1. Get lots of rest. Drink extra fluids (unless a doctor has told you not to).  2. Take Tylenol (acetaminophen) for fever or pain. If you have liver or kidney problems, ask your family doctor if it's okay to take Tylenol.     Adults can take either:  ? 650 mg (two 325 mg pills) every 4 to 6 hours, or   ? 1,000 mg (two 500 mg pills) every 8 hours as needed.  ? Note: Don't take more than 3,000 mg in one day. Acetaminophen is found in many medicines (both prescribed and over-the-counter medicines). Read all labels to be sure you don't take too much.   For children, check the Tylenol bottle for the right dose. The dose is based on the child's age or weight.  3. If you have other health problems (like cancer, heart failure, an organ transplant or severe kidney disease): Call your specialty clinic if you don't feel better in the next 2 days.  4. Know when to call 911. Emergency warning signs include:  ? Trouble breathing or shortness of breath  ? Pain or pressure in the chest that doesn't go away  ? Feeling confused like you haven't felt before, or not being able to wake up  ? Bluish-colored lips or face  5. Your doctor may have prescribed a blood thinner medicine. Follow their instructions.  Where can I get more information?     [facility] - About COVID-19: [email or web address]    CDC - What to Do If You're Sick: www.cdc.gov/coronavirus/2019-ncov/about/steps-when-sick.html    CDC - Ending Home Isolation:  www.cdc.gov/coronavirus/2019-ncov/hcp/disposition-in-home-patients.html    CDC - Caring for Someone: www.cdc.gov/coronavirus/2019-ncov/if-you-are-sick/care-for-someone.html    [facility] - Interim Guidance for Hospital Discharge to Home: www.[facility].[place]/diseases/coronavirus/hcp/hospdischarge.pdf    [facility] clinical trials (COVID-19 research studies): clinicalaffairs.[facility]-clinical-trials    Below are the COVID-19 hotlines at the [place] Department of Health ([facility]). Interpreters are available.  ? For health questions: Call 897-015-7472 or 1-601.854.9801 (7 a.m. to 7 p.m.)  ? For questions about schools and childcare: Call 640-143-5486 or 1-938.157.8550 (7 a.m. to 7 p.m.)    For informational purposes only. Not to replace the advice of your health care provider. Clinically reviewed by the Infection Prevention Team. Copyright   2020 [facility]. All rights reserved. [employer] 298660 - REV 08/04/20.

## 2020-09-08 NOTE — LETTER
[facility]  [address] [address]  [phone]      September 8, 2020    RE:  Loraine Davis                                                                                                                                                       [address] [facility] [address]            To whom it may concern:    Loraine Davis is under my professional care for Medical.  She  may return to work with the following:         Stay home and away from others (self-isolate) until:  ? At least 10 days have passed since your symptoms started. And   ? You've had no fever--and no medicine that reduces fever--for 1 full day (24 hours). And   Your other symptoms have resolved (gotten better).          Sincerely,        Vance Frankel, DO    [facility]

## 2020-09-10 LAB
SARS-COV-2 RNA SPEC QL NAA+PROBE: NOT DETECTED
SPECIMEN SOURCE: NORMAL

## 2020-09-24 DIAGNOSIS — F31.81 BIPOLAR II DISORDER (H): ICD-10-CM

## 2020-09-30 RX ORDER — LAMOTRIGINE 200 MG/1
200 TABLET ORAL DAILY
Qty: 90 TABLET | Refills: 1 | OUTPATIENT
Start: 2020-09-30

## 2020-09-30 NOTE — TELEPHONE ENCOUNTER
Lamictal 200mg last rx'd 5/27/20, 6 month supply. Confirmed with patient that she has medication. No refill needed at this time.    Lindsay Porter RN on 9/30/2020 at 10:15 AM

## 2020-10-01 ENCOUNTER — VIRTUAL VISIT (OUTPATIENT)
Dept: PSYCHOLOGY | Facility: CLINIC | Age: 25
End: 2020-10-01
Payer: COMMERCIAL

## 2020-10-01 DIAGNOSIS — F31.81 BIPOLAR II DISORDER (H): Primary | ICD-10-CM

## 2020-10-01 DIAGNOSIS — F41.1 GENERALIZED ANXIETY DISORDER: ICD-10-CM

## 2020-10-01 PROCEDURE — 99214 OFFICE O/P EST MOD 30 MIN: CPT | Mod: 95 | Performed by: PSYCHIATRY & NEUROLOGY

## 2020-10-01 RX ORDER — LAMOTRIGINE 200 MG/1
300 TABLET ORAL DAILY
Qty: 135 TABLET | Refills: 1 | Status: SHIPPED | OUTPATIENT
Start: 2020-10-01 | End: 2020-12-29

## 2020-10-01 RX ORDER — ZOLPIDEM TARTRATE 5 MG/1
5 TABLET ORAL
Qty: 30 TABLET | Refills: 2 | Status: SHIPPED | OUTPATIENT
Start: 2020-10-01 | End: 2020-12-29

## 2020-10-01 ASSESSMENT — ANXIETY QUESTIONNAIRES
5. BEING SO RESTLESS THAT IT IS HARD TO SIT STILL: MORE THAN HALF THE DAYS
GAD7 TOTAL SCORE: 19
6. BECOMING EASILY ANNOYED OR IRRITABLE: NEARLY EVERY DAY
7. FEELING AFRAID AS IF SOMETHING AWFUL MIGHT HAPPEN: MORE THAN HALF THE DAYS
1. FEELING NERVOUS, ANXIOUS, OR ON EDGE: NEARLY EVERY DAY
IF YOU CHECKED OFF ANY PROBLEMS ON THIS QUESTIONNAIRE, HOW DIFFICULT HAVE THESE PROBLEMS MADE IT FOR YOU TO DO YOUR WORK, TAKE CARE OF THINGS AT HOME, OR GET ALONG WITH OTHER PEOPLE: EXTREMELY DIFFICULT
2. NOT BEING ABLE TO STOP OR CONTROL WORRYING: NEARLY EVERY DAY
3. WORRYING TOO MUCH ABOUT DIFFERENT THINGS: NEARLY EVERY DAY

## 2020-10-01 ASSESSMENT — PATIENT HEALTH QUESTIONNAIRE - PHQ9
SUM OF ALL RESPONSES TO PHQ QUESTIONS 1-9: 15
5. POOR APPETITE OR OVEREATING: NEARLY EVERY DAY

## 2020-10-01 NOTE — PROGRESS NOTES
"Loraine Davis is a 25 year old female who is being evaluated via a billable telephone visit.      The patient has been notified of following:     \"This telephone visit will be conducted via a call between you and your physician/provider. We have found that certain health care needs can be provided without the need for a physical exam.  This service lets us provide the care you need with a short phone conversation.  If a prescription is necessary we can send it directly to your pharmacy.  If lab work is needed we can place an order for that and you can then stop by our lab to have the test done at a later time.    Telephone visits are billed at different rates depending on your insurance coverage. During this emergency period, for some insurers they may be billed the same as an in-person visit.  Please reach out to your insurance provider with any questions.    If during the course of the call the physician/provider feels a telephone visit is not appropriate, you will not be charged for this service.\"    Patient has given verbal consent for Telephone visit?  Yes    What phone number would you like to be contacted at? [phone]    How would you like to obtain your AVS? [facility]         Outpatient Psychiatric Progress Note    Name: Loraine Davis   : 1995                    Primary Care Provider: Physician No Ref-Primary   Therapist: [facility]     PHQ-9 scores:  PHQ-9 SCORE 2020 2020 10/1/2020   PHQ-9 Total Score 11 17 15       NICHO-7 scores:  NICHO-7 SCORE 2020 2020 10/1/2020   Total Score 16 16 19       Patient Identification:  Loraine is a 25 year old year old, single  White American female  who presents for return visit with me.  Patient attended the session alone.     Interim History:  Terry reports that she has not been sleeping very well, but is trying not to use Ambien on a daily basis.  She has been running daily, has eliminated " "screen time before bed, and has been getting up at about 430 every morning.  She is still having trouble falling asleep at night.  She reports increased energy and is \"going, going, going.\"  She feels like she will get sad or depressed if she does not keep going, and then she is not productive and feels bad about herself.    She noticed that that lamotrigine helps her be emotionally more stable.  She has been able to resist the urge to get back with her ex-boyfriend.  She is less reactive at work.    I am suspicious that she may be in the midst of of hypomanic episode.  We agreed to increase her lamotrigine to 300 mg daily.  She has tried Seroquel 25 mg in the past, but was oversedated.  Hydroxyzine slows her racing thoughts, but does not make her sleepy.  She can certainly use Ambien daily if needed.    Terry reports that her brother moved to [place] to go to school.  Their father is still in [place], because if he comes back to the  he will be able go back to [place] and his business and his family they are need him.  She recently was able to visit her sister in [place].    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  She says that she \"loves to take care of people, and enjoys working in restaurants.\"  She is putting off her start date until next fall, but is very excited about the opportunity.    Vital Signs:   No vitals taken for this encounter.    Current Medications:  Current Outpatient Medications   Medication     hydrOXYzine (VISTARIL) 25 MG capsule     lamoTRIgine (LAMICTAL) 200 MG tablet     levonorgestrel (MIRENA) 20 MCG/24HR IUD     valACYclovir (VALTREX) 500 MG tablet     zolpidem (AMBIEN) 5 MG tablet     No current facility-administered medications for this visit.         Mental Status Examination:  Terry is a 25-year-old woman in no acute distress.  She sounded rather sleepy when we first started talking, but became more animated.  Speech is clear and normal " in rate and tone.  Mood is slightly elevated.  Thoughts are logical and spontaneous with no loose associations or flight of ideas.  Thought content shows no psychosis.  No suicidal thoughts.  She is alert and oriented x3.    Assessment and Plan:    ICD-10-CM    1. Bipolar II disorder (H)  F31.81 lamoTRIgine (LAMICTAL) 200 MG tablet     zolpidem (AMBIEN) 5 MG tablet   2. Generalized anxiety disorder  F41.1        Medical comorbidities include:   Patient Active Problem List   Diagnosis     RA (rheumatoid arthritis) (H)     Bipolar II disorder (H)     Generalized anxiety disorder       Treatment Plan:  Patient Instructions   Increase lamotrigine to 300 mg at bedtime.    Continue Ambien 5 mg at bedtime.    Continue hydroxyzine 25 mg daily as needed for anxiety.    Continue all other medications per your primary care provider.    Schedule an appointment with me in 4 weeks or sooner as needed.  You may call [facility] at [phone] to schedule, or schedule at the .    [place] Resources:      Go to the Emergency Department as needed or call after hours crisis line at [phone].      To schedule individual or family therapy, call [facility] at [phone].     Follow up with primary care provider as planned or sooner for acute medical concerns.    Call the psychiatric nurse line with medication questions or concerns at [phone].    [employer] may be used to communicate with your provider, but this is not intended to be used for emergencies.    Community Resources:      National Suicide Prevention Lifeline: 463.944.8372 (TTY: 972.159.6877). Call anytime for help.  (www.suicidepreventionlifeline.org)    National [place] on Mental Illness (www.freddie.org): 594.316.1426 or 961-807-2101.     Mental Health Association (www.mentalhealth.org): 143.312.5109 or 246-255-7889.    [place] Crisis Text Line: Text [place] to 755699    Suicide LifeLine Chat:  suicidepreventionlifeline.org/itembase         Administrative Billing:   Phone call duration: 32 minutes, greater than 16 minutes spent in supportive psychotherapy regarding life stressors, future plans.      Patient Status:  This is a continuous care patient and medications will be prescribed by the psychiatric provider until further indicated.

## 2020-10-01 NOTE — PATIENT INSTRUCTIONS
Increase lamotrigine to 300 mg at bedtime.    Continue Ambien 5 mg at bedtime.    Continue hydroxyzine 25 mg daily as needed for anxiety.    Continue all other medications per your primary care provider.    Schedule an appointment with me in 4 weeks or sooner as needed.  You may call [facility] at [phone] to schedule, or schedule at the .    [facility] Resources:      Go to the Emergency Department as needed or call after hours crisis line at [phone].      To schedule individual or family therapy, call [facility] at [phone].     Follow up with primary care provider as planned or sooner for acute medical concerns.    Call the psychiatric nurse line with medication questions or concerns at [phone].    [employer] may be used to communicate with your provider, but this is not intended to be used for emergencies.    Community Resources:      National Suicide Prevention Lifeline: 452.923.4200 (TTY: 833.454.5022). Call anytime for help.  (www.suicidepreventionlifeline.org)    National [place] on Mental Illness (www.freddie.org): 761.376.1579 or 842-644-9126.     Mental Health Association (www.mentalhealth.org): 135.984.6707 or 165-952-8025.    [place] Crisis Text Line: Text [place] to 600264    Suicide LifeLine Chat: suicideTicketsNowline.org/chat

## 2020-10-02 ASSESSMENT — ANXIETY QUESTIONNAIRES: GAD7 TOTAL SCORE: 19

## 2020-11-05 ENCOUNTER — VIRTUAL VISIT (OUTPATIENT)
Dept: PSYCHOLOGY | Facility: CLINIC | Age: 25
End: 2020-11-05
Payer: COMMERCIAL

## 2020-11-05 DIAGNOSIS — F41.1 GENERALIZED ANXIETY DISORDER: ICD-10-CM

## 2020-11-05 DIAGNOSIS — F31.81 BIPOLAR II DISORDER (H): Primary | ICD-10-CM

## 2020-11-05 PROCEDURE — 90833 PSYTX W PT W E/M 30 MIN: CPT | Mod: 95 | Performed by: PSYCHIATRY & NEUROLOGY

## 2020-11-05 PROCEDURE — 99213 OFFICE O/P EST LOW 20 MIN: CPT | Mod: 95 | Performed by: PSYCHIATRY & NEUROLOGY

## 2020-11-05 RX ORDER — TRAZODONE HYDROCHLORIDE 50 MG/1
TABLET, FILM COATED ORAL
Qty: 60 TABLET | Refills: 2 | Status: SHIPPED | OUTPATIENT
Start: 2020-11-05 | End: 2020-11-18 | Stop reason: SINTOL

## 2020-11-05 ASSESSMENT — ANXIETY QUESTIONNAIRES
5. BEING SO RESTLESS THAT IT IS HARD TO SIT STILL: SEVERAL DAYS
7. FEELING AFRAID AS IF SOMETHING AWFUL MIGHT HAPPEN: SEVERAL DAYS
IF YOU CHECKED OFF ANY PROBLEMS ON THIS QUESTIONNAIRE, HOW DIFFICULT HAVE THESE PROBLEMS MADE IT FOR YOU TO DO YOUR WORK, TAKE CARE OF THINGS AT HOME, OR GET ALONG WITH OTHER PEOPLE: VERY DIFFICULT
6. BECOMING EASILY ANNOYED OR IRRITABLE: MORE THAN HALF THE DAYS
1. FEELING NERVOUS, ANXIOUS, OR ON EDGE: NEARLY EVERY DAY
GAD7 TOTAL SCORE: 15
2. NOT BEING ABLE TO STOP OR CONTROL WORRYING: MORE THAN HALF THE DAYS
3. WORRYING TOO MUCH ABOUT DIFFERENT THINGS: NEARLY EVERY DAY

## 2020-11-05 ASSESSMENT — PATIENT HEALTH QUESTIONNAIRE - PHQ9
5. POOR APPETITE OR OVEREATING: NEARLY EVERY DAY
SUM OF ALL RESPONSES TO PHQ QUESTIONS 1-9: 13

## 2020-11-05 NOTE — PATIENT INSTRUCTIONS
Decrease lamotrigine to 200 mg daily because of acne.    Start trazodone 25 to 100 mg at bedtime as needed for sleep.    Continue all other medications per your primary care provider.    Continue individual therapy.    Schedule an appointment with me in 4 weeks or sooner as needed.  You may call [facility] at [phone] to schedule, or schedule at the .    [facility] Resources:      Go to the Emergency Department as needed or call after hours crisis line at [phone].      To schedule individual or family therapy, call [facility] at [phone].     Follow up with primary care provider as planned or sooner for acute medical concerns.    Call the psychiatric nurse line with medication questions or concerns at [phone].    [employer] may be used to communicate with your provider, but this is not intended to be used for emergencies.    Community Resources:      National Suicide Prevention Lifeline: 820.482.7556 (TTY: 390.187.9122). Call anytime for help.  (www.suicidepreventionlifeline.org)    National [place] on Mental Illness (www.freddie.org): 450.787.3568 or 589-614-5603.     Mental Health Association (www.mentalhealth.org): 969.754.3600 or 028-659-4409.    [place] Crisis Text Line: Text [place] to 194080    Suicide LifeLine Chat: suicideTouch Paymentsline.org/chat

## 2020-11-05 NOTE — PROGRESS NOTES
"  Loraine Davis is a 25 year old female who is being evaluated via a billable telephone visit.      The patient has been notified of following:     \"This telephone visit will be conducted via a call between you and your physician/provider. We have found that certain health care needs can be provided without the need for a physical exam.  This service lets us provide the care you need with a short phone conversation.  If a prescription is necessary we can send it directly to your pharmacy.  If lab work is needed we can place an order for that and you can then stop by our lab to have the test done at a later time.    Telephone visits are billed at different rates depending on your insurance coverage. During this emergency period, for some insurers they may be billed the same as an in-person visit.  Please reach out to your insurance provider with any questions.    If during the course of the call the physician/provider feels a telephone visit is not appropriate, you will not be charged for this service.\"    Patient has given verbal consent for Telephone visit?  Yes    What phone number would you like to be contacted at? [phone]    How would you like to obtain your AVS? [facility]      Outpatient Psychiatric Progress Note    Name: Loraine Davis   : 1995                    Primary Care Provider: Maddie Colindres MD  Therapist: [facility]      PHQ-9 scores:  PHQ-9 SCORE 2020 10/1/2020 2020   PHQ-9 Total Score 17 15 13       NICHO-7 scores:  NICHO-7 SCORE 2020 10/1/2020 2020   Total Score 16 19 15       Patient Identification:  Loraine is a 25 year old year old, single  White American female  who presents for return visit with me.  Patient attended the session alone.     Interim History:  Terry reports that her life is \"a mixed bag.\"  She has decided to go to the [employer] beginning in January.  She is \"super excited.\"  She " "will be living on campus, her mom is planning to help with the move.  Plans to be back in [place] a few times a year, and is wondering about continuing psychiatric care with me.    She reports that work has been difficult.  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  Her last day is going to be on December 21.  She identifies work is her biggest stressor at this point.  She has increased lamotrigine to 300 mg a day, and denied any adverse side effects.  Later in the appointment, she reported that she has been having \"stress acne.\"  I am suspicious this is a side effect of the lamotrigine, and we agreed to decrease her dose back to 200 mg daily.  If her mood elevates again, we may need to add something to the lamotrigine or try a different medication.    She reports that she is \"all over the place all the time.\"  She has been having some difficulty sleeping.  She takes Ambien, she sleeps well and is not tired the next day.  Without it, she spends a lot of time worrying to her brain off.  She finds that it difficult to stay asleep without it and then she is fatigued the next day.  She reports that her energy level has stabilized since we last.  She denies any impulsive or potentially harmful behaviors.    She reports that she still sees her ex-partner at work, but she is feeling more \"detached\" from him.  She still gets very sad when she is reminded of their past relationship, for instance when she sees where they used to live or places they would go together.    Vital Signs:   There were no vitals taken for this visit.    Current Medications:  Current Outpatient Medications   Medication     hydrOXYzine (VISTARIL) 25 MG capsule     lamoTRIgine (LAMICTAL) 200 MG tablet     levonorgestrel (MIRENA) 20 MCG/24HR IUD     traZODone (DESYREL) 50 MG tablet     valACYclovir (VALTREX) 500 MG tablet     zolpidem (AMBIEN) 5 MG tablet     No current " facility-administered medications for this visit.         Mental Status Examination:  Terry is a 25-year-old woman in no acute distress.  Speech is clear and normal in rate and tone.  Mood is somewhat anxious and depressed.  Thoughts are logical and spontaneous with no loose associations or flight of ideas.  Content shows no psychosis.  No suicidal thoughts.  She is alert and oriented x3.    Assessment and Plan:    ICD-10-CM    1. Bipolar II disorder (H)  F31.81 traZODone (DESYREL) 50 MG tablet   2. Generalized anxiety disorder  F41.1        Medical comorbidities include:   Patient Active Problem List   Diagnosis     RA (rheumatoid arthritis) (H)     Bipolar II disorder (H)     Generalized anxiety disorder       Treatment Plan:  Patient Instructions   Decrease lamotrigine to 200 mg daily because of acne.    Start trazodone 25 to 100 mg at bedtime as needed for sleep.    Continue all other medications per your primary care provider.    Continue individual therapy.    Schedule an appointment with me in 4 weeks or sooner as needed.  You may call [facility] at [phone] to schedule, or schedule at the .    [facility]:      Go to the Emergency Department as needed or call after hours crisis line at [phone].      To schedule individual or family therapy, call [facility] at [phone].     Follow up with primary care provider as planned or sooner for acute medical concerns.    Call the psychiatric nurse line with medication questions or concerns at [phone].    [place] may be used to communicate with your provider, but this is not intended to be used for emergencies.    Community Resources:      National Suicide Prevention Lifeline: 940.431.7585 (TTY: 798.578.8439). Call anytime for help.  (www.suicidepreventionlifeline.org)    National [place] on Mental Illness (www.freddie.org): 308.503.4032 or 771-704-9668.     Mental Health Association  (www.mentalhealth.org): 306-565-7669 or 482-687-1166.    [place] Crisis Text Line: Text [place] to 077069    Suicide LifeLine Chat: suicidepreHandupline.org/chat         Administrative Billing:   Phone call duration: 29 minutes  Total time: 39 minutes, greater than 16 minutes was spent in supportive psychotherapy regarding her past relationship and her decision to move to [place].    Patient Status:  This is a continuous care patient and medications will be prescribed by the psychiatric provider until further indicated.

## 2020-11-06 ASSESSMENT — ANXIETY QUESTIONNAIRES: GAD7 TOTAL SCORE: 15

## 2020-11-17 ENCOUNTER — TELEPHONE (OUTPATIENT)
Dept: BEHAVIORAL HEALTH | Facility: CLINIC | Age: 25
End: 2020-11-17

## 2020-11-17 NOTE — TELEPHONE ENCOUNTER
"Nurse Triage SBAR    Situation: Loraine Davis is calling regarding a new or worsening symptom of anxiety and irritability.    Background:   Pertinent diagnoses of: bipolar 2, NICHO  Last office visit: 11/5/20  Next office visit: 12/3/20  Missed appointments: Yes     Expected Return to Clinic: 4 weeks  Summary from Assessment and Treatment Plan by  from last office visit on 11/5:  Patient Instructions   Decrease lamotrigine to 200 mg daily because of acne.     Start trazodone 25 to 100 mg at bedtime as needed for sleep.     Continue all other medications per your primary care provider.     Continue individual therapy.     Schedule an appointment with me in 4 weeks or sooner as needed.     Assessment:   Description: Patient reports feeling \"on edge\", having difficulty regulating and controlling emotions, and \"having a hard time keeping it together\".  Onset/duration: The past 2 weeks  Precip. factors:  Patient feels the symptoms are related to a decreased lamictal dose. Patient is in the process of transitioning into a new role and feels that is increasing her stress level, however her therapist feels this is more stressed than normal.   Do symptoms interfere with responsibilities? YES--interferes with work, relationships and daily activities/conversations    Patient is taking ambien \"almost every night\", not taking trazodone-- did try it but patient felt it made her anxious. Patient feels that she is getting adequate sleep every night.    Patient was speaking very fast throughout conversation with writer. Seemed calm during phone call.    Per patient, ok to LVM with provider response if needed.      Risk Assessment:  Thoughts of harming self or others: NO     Medications:   Taking medication(s) as prescribed? Yes  Taking over the counter medication(s)? No  Any medication side effects? Feels that the trazodone makes her anxious    Any barriers to taking medication(s) as prescribed?  No  Medication(s) " improving/managing symptoms?  No  Medication reconciliation completed: Yes  Any use of alcohol or illicit substances: No    Recommendation:   Routing to provider for recommendation on medication dosing.   Appointment for tomorrow morning placed on hold by intake-- please advise if patient should be scheduled in that spot to discuss medications directly.    Protocol Recommended Disposition:   routine office visit as previously planned and consult with provider-- nursing to contact patient with response    Nurse Triage Follow Up:   Safety Plan Reviewed: Yes. Emergency Department as needed and After Hours Crisis Line: [phone] or [phone]  Patient informed of recommendations and reasons to call back or seek care in the ED. Patient verbalized understanding and agrees with the plan.      Nidia Oconnell RN   Nurse Liaison  [facility]

## 2020-11-18 ENCOUNTER — VIRTUAL VISIT (OUTPATIENT)
Dept: PSYCHOLOGY | Facility: CLINIC | Age: 25
End: 2020-11-18
Payer: COMMERCIAL

## 2020-11-18 DIAGNOSIS — F31.81 BIPOLAR II DISORDER (H): Primary | ICD-10-CM

## 2020-11-18 DIAGNOSIS — F41.1 GENERALIZED ANXIETY DISORDER: ICD-10-CM

## 2020-11-18 PROCEDURE — 99213 OFFICE O/P EST LOW 20 MIN: CPT | Mod: 95 | Performed by: PSYCHIATRY & NEUROLOGY

## 2020-11-18 ASSESSMENT — ANXIETY QUESTIONNAIRES
IF YOU CHECKED OFF ANY PROBLEMS ON THIS QUESTIONNAIRE, HOW DIFFICULT HAVE THESE PROBLEMS MADE IT FOR YOU TO DO YOUR WORK, TAKE CARE OF THINGS AT HOME, OR GET ALONG WITH OTHER PEOPLE: EXTREMELY DIFFICULT
3. WORRYING TOO MUCH ABOUT DIFFERENT THINGS: MORE THAN HALF THE DAYS
2. NOT BEING ABLE TO STOP OR CONTROL WORRYING: MORE THAN HALF THE DAYS
5. BEING SO RESTLESS THAT IT IS HARD TO SIT STILL: SEVERAL DAYS
1. FEELING NERVOUS, ANXIOUS, OR ON EDGE: NEARLY EVERY DAY
GAD7 TOTAL SCORE: 15
7. FEELING AFRAID AS IF SOMETHING AWFUL MIGHT HAPPEN: SEVERAL DAYS
6. BECOMING EASILY ANNOYED OR IRRITABLE: NEARLY EVERY DAY

## 2020-11-18 ASSESSMENT — PATIENT HEALTH QUESTIONNAIRE - PHQ9
5. POOR APPETITE OR OVEREATING: NEARLY EVERY DAY
SUM OF ALL RESPONSES TO PHQ QUESTIONS 1-9: 14

## 2020-11-18 NOTE — PATIENT INSTRUCTIONS
Increase lamotrigine to 250 mg daily for one week, then increase to previous calabrese of 300 mg daily.    Stop trazodone.    Continue zolpidem 5 mg at bedtime as needed for insomnia.    Continue all other medications per your primary care provider.    Schedule an appointment with me in 2 weeks or sooner as needed.  You may call [facility] at [phone] to schedule, or schedule at the .    [facility] Resources:      Go to the Emergency Department as needed or call after hours crisis line at [phone].      To schedule individual or family therapy, call [facility] at [phone].     Follow up with primary care provider as planned or sooner for acute medical concerns.    Call the psychiatric nurse line with medication questions or concerns at [phone].    [employer] may be used to communicate with your provider, but this is not intended to be used for emergencies.    Community Resources:      National Suicide Prevention Lifeline: 335.598.3411 (TTY: 577.458.9260). Call anytime for help.  (www.suicidepreventionlifeline.org)    National [place] on Mental Illness (www.freddie.org): 582.216.2702 or 737-288-3710.     Mental Health Association (www.mentalhealth.org): 555.604.2842 or 006-676-4511.    [place] Crisis Text Line: Text [place] to 774398    Suicide LifeLine Chat: suicidepreKodkodline.org/chat

## 2020-11-18 NOTE — PROGRESS NOTES
"Loraine Davis is a 25 year old female who is being evaluated via a billable telephone visit.      The patient has been notified of following:     \"This telephone visit will be conducted via a call between you and your physician/provider. We have found that certain health care needs can be provided without the need for a physical exam.  This service lets us provide the care you need with a short phone conversation.  If a prescription is necessary we can send it directly to your pharmacy.  If lab work is needed we can place an order for that and you can then stop by our lab to have the test done at a later time.    Telephone visits are billed at different rates depending on your insurance coverage. During this emergency period, for some insurers they may be billed the same as an in-person visit.  Please reach out to your insurance provider with any questions.    If during the course of the call the physician/provider feels a telephone visit is not appropriate, you will not be charged for this service.\"    Patient has given verbal consent for Telephone visit?  Yes    What phone number would you like to be contacted at? [phone]    How would you like to obtain your AVS? [facility]        Outpatient Psychiatric Progress Note    Name: Loraine Davis   : 1995                    Primary Care Provider: Maddie Colindres MD  Therapist: [facility]        PHQ-9 scores:  PHQ-9 SCORE 10/1/2020 2020 2020   PHQ-9 Total Score 15 13 14       NICHO-7 scores:  NICHO-7 SCORE 10/1/2020 2020 2020   Total Score 19 15 15       Patient Identification:  Loraine is a 25 year old year old, single  White American female  who presents for return visit with me.  Patient attended the session alone.     Interim History:  Loraine reports that she has not been doing well since we decreased her lamotrigine from 300 mg a day to 200 mg a day.  She is very emotionally reactive.  She " is not sleeping well.  The acne on her chin and temples has not really improved.    We discussed several options for helping to stabilize her mood, including antipsychotic such as Abilify or Latuda, switching to another anticonvulsant such as oxcarbazepine, or increasing her lamotrigine back to 300 mg a day.  After discussion of risks and benefits, she preferred to increase the lamotrigine back to 300 mg a day and will see a dermatologist regarding acne.    She tried trazodone for sleep, but it made her very jittery.  She can go back to just using Ambien.    Vital Signs:   No vitals taken for this encounter.      Current Medications:  Current Outpatient Medications   Medication     hydrOXYzine (VISTARIL) 25 MG capsule     lamoTRIgine (LAMICTAL) 200 MG tablet     levonorgestrel (MIRENA) 20 MCG/24HR IUD     valACYclovir (VALTREX) 500 MG tablet     zolpidem (AMBIEN) 5 MG tablet     No current facility-administered medications for this visit.       Mental Status Examination:  Loraine is a 25-year-old woman in no acute distress.  Speech is clear and normal in rate and tone.  Mood is reactive by her report.  Thoughts are logical and spontaneous with no loose associations or flight of ideas.  No psychosis.  No suicidal thoughts.  She is alert and oriented x3.    Assessment and Plan:    ICD-10-CM    1. Bipolar II disorder (H)  F31.81    2. Generalized anxiety disorder  F41.1        Medical comorbidities include:   Patient Active Problem List   Diagnosis     RA (rheumatoid arthritis) (H)     Bipolar II disorder (H)     Generalized anxiety disorder       Treatment Plan:  Patient Instructions   Increase lamotrigine to 250 mg daily for one week, then increase to previous calabrese of 300 mg daily.    Stop trazodone.    Continue zolpidem 5 mg at bedtime as needed for insomnia.    Continue all other medications per your primary care provider.    Schedule an appointment with me in 2 weeks or sooner as needed.  You may call [facility] at [phone] to schedule, or schedule at the .    [facility] Resources:      Go to the Emergency Department as needed or call after hours crisis line at [phone].      To schedule individual or family therapy, call [facility] at [phone].     Follow up with primary care provider as planned or sooner for acute medical concerns.    Call the psychiatric nurse line with medication questions or concerns at [phone].    MONOQIt may be used to communicate with your provider, but this is not intended to be used for emergencies.    Community Resources:      National Suicide Prevention Lifeline: 346.725.2768 (TTY: 365.211.9918). Call anytime for help.  (www.suicidepreventionlifeline.org)    National [place] on Mental Illness (www.freddie.org): 891.453.4379 or 826-141-0901.     Mental Health Association (www.mentalhealth.org): 548.243.8289 or 508-249-9526.    [place] Crisis Text Line: Text [place] to 509271    Suicide LifeLine Chat: suicidepre[place] Online Healthline.org/chat         Administrative Billing:   Phone call duration: 14 minutes      Patient Status:  This is a continuous care patient and medications will be prescribed by the psychiatric provider until further indicated.

## 2020-11-18 NOTE — TELEPHONE ENCOUNTER
Patient was seen this morning.   Closing encounter.    Nidia Oconnell, RN    Nurse Liaison  [facility]

## 2020-11-19 ASSESSMENT — ANXIETY QUESTIONNAIRES: GAD7 TOTAL SCORE: 15

## 2020-12-03 ENCOUNTER — VIRTUAL VISIT (OUTPATIENT)
Dept: PSYCHOLOGY | Facility: CLINIC | Age: 25
End: 2020-12-03
Payer: COMMERCIAL

## 2020-12-03 DIAGNOSIS — F31.81 BIPOLAR II DISORDER (H): Primary | ICD-10-CM

## 2020-12-03 DIAGNOSIS — F41.1 GENERALIZED ANXIETY DISORDER: ICD-10-CM

## 2020-12-03 PROCEDURE — 99213 OFFICE O/P EST LOW 20 MIN: CPT | Mod: 95 | Performed by: PSYCHIATRY & NEUROLOGY

## 2020-12-03 PROCEDURE — 90833 PSYTX W PT W E/M 30 MIN: CPT | Mod: 95 | Performed by: PSYCHIATRY & NEUROLOGY

## 2020-12-03 ASSESSMENT — ANXIETY QUESTIONNAIRES
5. BEING SO RESTLESS THAT IT IS HARD TO SIT STILL: SEVERAL DAYS
3. WORRYING TOO MUCH ABOUT DIFFERENT THINGS: MORE THAN HALF THE DAYS
2. NOT BEING ABLE TO STOP OR CONTROL WORRYING: SEVERAL DAYS
1. FEELING NERVOUS, ANXIOUS, OR ON EDGE: MORE THAN HALF THE DAYS
GAD7 TOTAL SCORE: 10
7. FEELING AFRAID AS IF SOMETHING AWFUL MIGHT HAPPEN: NOT AT ALL
IF YOU CHECKED OFF ANY PROBLEMS ON THIS QUESTIONNAIRE, HOW DIFFICULT HAVE THESE PROBLEMS MADE IT FOR YOU TO DO YOUR WORK, TAKE CARE OF THINGS AT HOME, OR GET ALONG WITH OTHER PEOPLE: SOMEWHAT DIFFICULT
6. BECOMING EASILY ANNOYED OR IRRITABLE: MORE THAN HALF THE DAYS

## 2020-12-03 ASSESSMENT — PATIENT HEALTH QUESTIONNAIRE - PHQ9
SUM OF ALL RESPONSES TO PHQ QUESTIONS 1-9: 9
5. POOR APPETITE OR OVEREATING: MORE THAN HALF THE DAYS

## 2020-12-03 NOTE — PATIENT INSTRUCTIONS
Continue lamotrigine 300 mg daily.     Continue zolpidem 5 mg at bedtime as needed for insomnia.    Continue all other medications per your primary care provider.    Schedule an appointment with me in 55 weeks or sooner as needed.  You may call [facility] at [phone] to schedule, or schedule at the .    [facility] Resources:      Go to the Emergency Department as needed or call after hours crisis line at [phone].      To schedule individual or family therapy, call [facility] at [phone].     Follow up with primary care provider as planned or sooner for acute medical concerns.    Call the psychiatric nurse line with medication questions or concerns at [phone].    [employer] may be used to communicate with your provider, but this is not intended to be used for emergencies.    Community Resources:      National Suicide Prevention Lifeline: 473.993.9547 (TTY: 288.852.8790). Call anytime for help.  (www.suicidepreventionlifeline.org)    National [place] on Mental Illness (www.freddie.org): 326.659.2449 or 234-106-2171.     Mental Health Association (www.mentalhealth.org): 423.670.3333 or 577-255-4208.    [place] Crisis Text Line: Text [place] to 822450    Suicide LifeLine Chat: suicidepreventionlifeline.org/chat
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
## 2020-12-04 ASSESSMENT — ANXIETY QUESTIONNAIRES: GAD7 TOTAL SCORE: 10

## 2020-12-29 ENCOUNTER — VIRTUAL VISIT (OUTPATIENT)
Dept: PSYCHOLOGY | Facility: CLINIC | Age: 25
End: 2020-12-29
Payer: COMMERCIAL

## 2020-12-29 DIAGNOSIS — F41.1 GENERALIZED ANXIETY DISORDER: ICD-10-CM

## 2020-12-29 DIAGNOSIS — F31.81 BIPOLAR II DISORDER (H): Primary | ICD-10-CM

## 2020-12-29 PROCEDURE — 99213 OFFICE O/P EST LOW 20 MIN: CPT | Mod: TEL | Performed by: PSYCHIATRY & NEUROLOGY

## 2020-12-29 RX ORDER — ZOLPIDEM TARTRATE 5 MG/1
5 TABLET ORAL
Qty: 30 TABLET | Refills: 2 | Status: SHIPPED | OUTPATIENT
Start: 2020-12-29

## 2020-12-29 RX ORDER — LAMOTRIGINE 200 MG/1
300 TABLET ORAL DAILY
Qty: 135 TABLET | Refills: 1 | Status: SHIPPED | OUTPATIENT
Start: 2020-12-29

## 2020-12-29 ASSESSMENT — ANXIETY QUESTIONNAIRES
IF YOU CHECKED OFF ANY PROBLEMS ON THIS QUESTIONNAIRE, HOW DIFFICULT HAVE THESE PROBLEMS MADE IT FOR YOU TO DO YOUR WORK, TAKE CARE OF THINGS AT HOME, OR GET ALONG WITH OTHER PEOPLE: SOMEWHAT DIFFICULT
7. FEELING AFRAID AS IF SOMETHING AWFUL MIGHT HAPPEN: NOT AT ALL
2. NOT BEING ABLE TO STOP OR CONTROL WORRYING: SEVERAL DAYS
1. FEELING NERVOUS, ANXIOUS, OR ON EDGE: SEVERAL DAYS
GAD7 TOTAL SCORE: 5
6. BECOMING EASILY ANNOYED OR IRRITABLE: SEVERAL DAYS
5. BEING SO RESTLESS THAT IT IS HARD TO SIT STILL: NOT AT ALL
3. WORRYING TOO MUCH ABOUT DIFFERENT THINGS: SEVERAL DAYS

## 2020-12-29 ASSESSMENT — PATIENT HEALTH QUESTIONNAIRE - PHQ9
5. POOR APPETITE OR OVEREATING: SEVERAL DAYS
SUM OF ALL RESPONSES TO PHQ QUESTIONS 1-9: 8

## 2020-12-29 NOTE — PROGRESS NOTES
"Loraine Davis is a 25 year old female who is being evaluated via a billable telephone visit.      The patient has been notified of following:     \"This telephone visit will be conducted via a call between you and your physician/provider. We have found that certain health care needs can be provided without the need for a physical exam.  This service lets us provide the care you need with a short phone conversation.  If a prescription is necessary we can send it directly to your pharmacy.  If lab work is needed we can place an order for that and you can then stop by our lab to have the test done at a later time.    Telephone visits are billed at different rates depending on your insurance coverage. During this emergency period, for some insurers they may be billed the same as an in-person visit.  Please reach out to your insurance provider with any questions.    If during the course of the call the physician/provider feels a telephone visit is not appropriate, you will not be charged for this service.\"    Patient has given verbal consent for Telephone visit?  Yes    What phone number would you like to be contacted at? [phone]    How would you like to obtain your AVS? [facility]        Outpatient Psychiatric Progress Note    Name: Loraine Davis   : 1995                    Primary Care Provider: Physician No Ref-Primary   Therapist: [facility]     PHQ-9 scores:  PHQ-9 SCORE 2020 12/3/2020 2020   PHQ-9 Total Score 14 9 8       NICHO-7 scores:  NICHO-7 SCORE 2020 12/3/2020 2020   Total Score 15 10 5       Patient Identification:  Loraine is a 25 year old year old, single  White American female  who presents for return visit with me.  Patient attended the session alone.     Interim History:  Loraine reports that she is making a final preparations for moving to [place].  She and her mom are going to start driving there tomorrow, weather " permitting.  Her last day of work was yesterday, and she mainly feels a sense of relief, although she notes that she will miss her friends and coworkers there.    She reports that her mood has been good overall, although she has had a couple of down days lately and has been sleeping a little more.  She does not feel this is serious enough to warrant a medication change.    She is saw a dermatologist who gave her some skin care products that seem to be helping with her facial breakouts.  The dermatologist is suspicious that it is mainly because she has been wearing a mask all day at work, and although it could be related to lamotrigine, it is probably not.    We agreed that Loraine would establish a crisis plan when she gets to [place].  She plans to establish care with a primary care provider at the [facility].  She will follow up with me in the summer.    Vital Signs:   There were no vitals taken for this visit.    Current Medications:  Current Outpatient Medications   Medication     hydrOXYzine (VISTARIL) 25 MG capsule     lamoTRIgine (LAMICTAL) 200 MG tablet     levonorgestrel (MIRENA) 20 MCG/24HR IUD     valACYclovir (VALTREX) 500 MG tablet     zolpidem (AMBIEN) 5 MG tablet     No current facility-administered medications for this visit.       Mental Status Examination:  Loraine is a 25-year-old woman in no acute distress.  Speech is clear and normal in rate and tone.  Mood is good.  Thoughts are logical and spontaneous with no loose associations or flight of ideas.  Thought content shows no psychosis.  She is alert and oriented x3.  She denies suicidal ideation.    Assessment and Plan:    ICD-10-CM    1. Bipolar II disorder (H)  F31.81 zolpidem (AMBIEN) 5 MG tablet     lamoTRIgine (LAMICTAL) 200 MG tablet   2. Generalized anxiety disorder  F41.1        Medical comorbidities include:   Patient Active Problem List   Diagnosis     RA (rheumatoid arthritis) (H)     Bipolar II disorder (H)      Generalized anxiety disorder       Treatment Plan:  Patient Instructions   Continue lamotrigine 300 mg daily.     Continue zolpidem 5 mg at bedtime as needed for insomnia.    Continue all other medications per your primary care provider.    Schedule an appointment with me as needed.  You may call [facility] at [phone] to schedule, or schedule at the .    [facility] Resources:      Go to the Emergency Department as needed or call after hours crisis line at [phone].      To schedule individual or family therapy, call [facility] at [phone].     Follow up with primary care provider as planned or sooner for acute medical concerns.    Call the psychiatric nurse line with medication questions or concerns at [phone].    [employer]edo may be used to communicate with your provider, but this is not intended to be used for emergencies.    Community Resources:      National Suicide Prevention Lifeline: 710.705.9887 (TTY: 928.634.2406). Call anytime for help.  (www.suicidepreventionlifeline.org)    National [place] on Mental Illness (www.freddie.org): 458.654.7769 or 353-222-0233.     Mental Health Association (www.mentalhealth.org): 613.591.4409 or 066-030-0129.    [place] Crisis Text Line: Text [place] to 186612    Suicide LifeLine Chat: suicideScreaming Sportsline.org/chat       Administrative Billing:   Phone call duration: 16 minutes, greater than 50% spent in counseling regarding treatment planning    Patient Status:  This is a continuous care patient and medications will be prescribed by the psychiatric provider until further indicated.

## 2020-12-29 NOTE — PATIENT INSTRUCTIONS
Continue lamotrigine 300 mg daily.     Continue zolpidem 5 mg at bedtime as needed for insomnia.    Continue all other medications per your primary care provider.    Schedule an appointment with me as needed.  You may call [facility] at [phone] to schedule, or schedule at the .    [facility] Resources:      Go to the Emergency Department as needed or call after hours crisis line at [phone].      To schedule individual or family therapy, call [facility] at [phone].     Follow up with primary care provider as planned or sooner for acute medical concerns.    Call the psychiatric nurse line with medication questions or concerns at [phone].    [employer] may be used to communicate with your provider, but this is not intended to be used for emergencies.    Community Resources:      National Suicide Prevention Lifeline: 631.526.2859 (TTY: 564.124.3133). Call anytime for help.  (www.suicidepreventionlifeline.org)    National [place] on Mental Illness (www.freddie.org): 447.570.9539 or 703-171-7201.     Mental Health Association (www.mentalhealth.org): 719.507.5886 or 103-131-1583.    [place] Crisis Text Line: Text [place] to 165382    Suicide LifeLine Chat: suicidepreStarteedlifeline.org/chat

## 2020-12-30 ASSESSMENT — ANXIETY QUESTIONNAIRES: GAD7 TOTAL SCORE: 5

## 2021-01-15 ENCOUNTER — HEALTH MAINTENANCE LETTER (OUTPATIENT)
Age: 26
End: 2021-01-15

## 2021-01-20 NOTE — TELEPHONE ENCOUNTER
Please schedule for tomorrow morning per the hold by nursing.    Thank you!    Alexandra Alaniz MD  [facility]  [facility]     Patient is registered and referred to the [place] [facility]. Phone appointment scheduled for 1/23/21 at 0900./Yes

## 2021-06-03 NOTE — ANESTHESIA CARE TRANSFER NOTE
Last vitals:   Vitals:    11/19/19 0826   BP: 121/77   Pulse: 77   Resp: 16   Temp: 36.7  C (98  F)   SpO2: 97%     Patient's level of consciousness is drowsy  Spontaneous respirations: yes  Maintains airway independently: yes  Dentition unchanged: yes  Oropharynx: oral airway in place    QCDR Measures:  ASA# 20 - Surgical Safety Checklist: WHO surgical safety checklist completed prior to induction    PQRS# 430 - Adult PONV Prevention: 4558F - Pt received => 2 anti-emetic agents (different classes) preop & intraop  ASA# 8 - Peds PONV Prevention: NA - Not pediatric patient, not GA or 2 or more risk factors NOT present  PQRS# 424 - Catrina-op Temp Management: 4559F - At least one body temp DOCUMENTED => 35.5C or 95.9F within required timeframe  PQRS# 426 - PACU Transfer Protocol: - Transfer of care checklist used  ASA# 14 - Acute Post-op Pain: ASA14B - Patient did NOT experience pain >= 7 out of 10

## 2021-06-03 NOTE — ANESTHESIA POSTPROCEDURE EVALUATION
Patient: Loraine Davis  LAPAROSCOPIC RIGHT OVARIAN CYSTECTOMY  Anesthesia type: general    Patient location: PACU  Last vitals:   Vitals Value Taken Time   /65 11/19/2019 12:00 PM   Temp  11/19/2019 12:12 PM   Pulse 70 11/19/2019 12:09 PM   Resp 16 11/19/2019 11:56 AM   SpO2 99 % 11/19/2019 12:09 PM   Vitals shown include unvalidated device data.  Post vital signs: stable  Level of consciousness: awake and responds to simple questions  Post-anesthesia pain: pain controlled  Post-anesthesia nausea and vomiting: no  Pulmonary: unassisted, return to baseline  Cardiovascular: stable and blood pressure at baseline  Hydration: adequate  Anesthetic events: no    QCDR Measures:  ASA# 11 - Catrina-op Cardiac Arrest: ASA11B - Patient did NOT experience unanticipated cardiac arrest  ASA# 12 - Catrina-op Mortality Rate: ASA12B - Patient did NOT die  ASA# 13 - PACU Re-Intubation Rate: ASA13B - Patient did NOT require a new airway mgmt  ASA# 10 - Composite Anes Safety: ASA10A - No serious adverse event    Additional Notes:

## 2021-06-03 NOTE — ANESTHESIA PREPROCEDURE EVALUATION
Anesthesia Evaluation      Patient summary reviewed   No history of anesthetic complications     Airway   Mallampati: I  Neck ROM: full   Pulmonary - negative ROS and normal exam    breath sounds clear to auscultation  (+) asthma                           Cardiovascular - negative ROS and normal exam  Exercise tolerance: > or = 4 METS  Rhythm: regular  Rate: normal,         Neuro/Psych - negative ROS   (+) depression, anxiety/panic attacks,     Endo/Other - negative ROS      GI/Hepatic/Renal - negative ROS           Dental - normal exam                        Anesthesia Plan  Planned anesthetic: general endotracheal    ASA 2   Induction: intravenous   Anesthetic plan and risks discussed with: patient  Anesthesia plan special considerations: antiemetics,   Post-op plan: routine recovery

## 2021-06-04 VITALS — WEIGHT: 150 LBS | HEIGHT: 62 IN | BODY MASS INDEX: 27.6 KG/M2

## 2021-10-24 ENCOUNTER — HEALTH MAINTENANCE LETTER (OUTPATIENT)
Age: 26
End: 2021-10-24

## 2022-02-13 ENCOUNTER — HEALTH MAINTENANCE LETTER (OUTPATIENT)
Age: 27
End: 2022-02-13

## 2022-10-15 ENCOUNTER — HEALTH MAINTENANCE LETTER (OUTPATIENT)
Age: 27
End: 2022-10-15

## 2023-03-26 ENCOUNTER — HEALTH MAINTENANCE LETTER (OUTPATIENT)
Age: 28
End: 2023-03-26

## 2024-05-26 ENCOUNTER — HEALTH MAINTENANCE LETTER (OUTPATIENT)
Age: 29
End: 2024-05-26